# Patient Record
Sex: MALE | Race: WHITE | Employment: UNEMPLOYED | URBAN - NONMETROPOLITAN AREA
[De-identification: names, ages, dates, MRNs, and addresses within clinical notes are randomized per-mention and may not be internally consistent; named-entity substitution may affect disease eponyms.]

---
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
## 2020-07-06 ENCOUNTER — OFFICE VISIT (OUTPATIENT)
Dept: NEUROLOGY | Age: 20
End: 2020-07-06
Payer: COMMERCIAL

## 2020-07-06 VITALS
HEIGHT: 70 IN | WEIGHT: 185 LBS | BODY MASS INDEX: 26.48 KG/M2 | DIASTOLIC BLOOD PRESSURE: 68 MMHG | HEART RATE: 78 BPM | SYSTOLIC BLOOD PRESSURE: 112 MMHG | OXYGEN SATURATION: 98 % | TEMPERATURE: 97.8 F

## 2020-07-06 PROBLEM — R42 DIZZINESS: Status: ACTIVE | Noted: 2020-07-06

## 2020-07-06 PROBLEM — G43.009 MIGRAINE WITHOUT AURA AND WITHOUT STATUS MIGRAINOSUS, NOT INTRACTABLE: Status: ACTIVE | Noted: 2020-07-06

## 2020-07-06 PROCEDURE — G8419 CALC BMI OUT NRM PARAM NOF/U: HCPCS | Performed by: PSYCHIATRY & NEUROLOGY

## 2020-07-06 PROCEDURE — G8427 DOCREV CUR MEDS BY ELIG CLIN: HCPCS | Performed by: PSYCHIATRY & NEUROLOGY

## 2020-07-06 PROCEDURE — 99245 OFF/OP CONSLTJ NEW/EST HI 55: CPT | Performed by: PSYCHIATRY & NEUROLOGY

## 2020-07-06 PROCEDURE — 99214 OFFICE O/P EST MOD 30 MIN: CPT

## 2020-07-06 ASSESSMENT — ENCOUNTER SYMPTOMS
WHEEZING: 0
EYE ITCHING: 0
BLOOD IN STOOL: 0
CHOKING: 0
NAUSEA: 1
COUGH: 0
SORE THROAT: 0
EYE WATERING: 0
CHEST TIGHTNESS: 0
BACK PAIN: 0
SINUS PRESSURE: 0
SCALP TENDERNESS: 0
TROUBLE SWALLOWING: 0
SHORTNESS OF BREATH: 0
ABDOMINAL DISTENTION: 0
PHOTOPHOBIA: 1
ABDOMINAL PAIN: 0
FACIAL SWELLING: 0
EYE REDNESS: 0
DIARRHEA: 0
EYE DISCHARGE: 0
APNEA: 0
RHINORRHEA: 0
FACIAL SWEATING: 0
COLOR CHANGE: 0
VISUAL CHANGE: 1
VOMITING: 0
SWOLLEN GLANDS: 0
EYE PAIN: 0
CONSTIPATION: 0
VOICE CHANGE: 0
BLURRED VISION: 0

## 2020-07-06 NOTE — PROGRESS NOTES
[facility]  Neurology  [address]QO:[phone]   Fax: [phone]    SUBJECTIVE:     PATIENT ID:  Ariana Miguel is a  RIGHT    HANDED 21 y.o. male. Migraine    This is a chronic problem. Episode onset: SINCE    AGE OF   6  YEARS. The problem occurs intermittently. The problem has been waxing and waning. The pain is located in the left unilateral region. The pain does not radiate. The pain quality is not similar to prior headaches. The quality of the pain is described as aching, stabbing, pulsating and throbbing. The pain is at a severity of 3/10. The pain is mild. Associated symptoms include dizziness, nausea, neck pain, phonophobia, photophobia and a visual change. Pertinent negatives include no abdominal pain, abnormal behavior, anorexia, back pain, blurred vision, coughing, drainage, ear pain, eye pain, eye redness, eye watering, facial sweating, fever, hearing loss, insomnia, loss of balance, muscle aches, numbness, rhinorrhea, scalp tenderness, seizures, sinus pressure, sore throat, swollen glands, tingling, tinnitus, vomiting, weakness or weight loss. The symptoms are aggravated by bright light, emotional stress and noise. Treatments tried: TOPAMAX. The treatment provided no relief. History obtained from  The patient     and other  available   medical  records   were  Also  reviewed. The  Duration,  Quality,  Severity,  Location,  Timing,  Context,  Modifying  Factors   Of   The   Chief   Complaint   And  Present  Illness       Was   Reviewed   In   Chronological   Manner.                                                 PATIENT'S  MAIN  CONCERNS INCLUDE :                       1)     H/O    CHRONIC  HEADACHES   SINCE  AGE OF   6   YEARS                          2)      H/O    CHRONIC   MIGRAINES  SINCE  AGE OF   6   YEARS                                     ASSOCIATED    WITH     DIZZINESS,    NAUSEA    AND  VOMITING 1- 2    TIMES     PER  WEEK     LASTING  FOR     4 - 5  HOURS                             2)       HAD   NEUROLOGY    EVALUATION      AT  [facility]     IN   2018                                    HAD   TRIED    TOPAMAX  IN    THE PAST     AND                                      IT  DID  NOT  HELP                                                      3)      CHRONIC      ANXIETY  ,   DEPRESSION                                          AND   ADHD    SINCE     AGE    5 - 6  YEARS                                PATIENT  TO  FOLLOW  WITH  MENTAL  HEALTH PROFESSION                           4)       FAMILY     H/O     MIGRAINES                           5)     PATIENT  DENIES  ANY    BIRTH  AND  DEVELOPMENTAL PROBLEMS                                NO     H/O   HEAD  INJURY  IN   THE PAST                                           6)     IN  VIEW  OF  THE  PATIENT'S    MULTIPLE   NEUROLOGICAL                           SYMPTOMS  AND  CONCERNS    FOR  PROLONGED   DURATION,                           AND    MULTIPLE   CO MORBID  MEDICAL  CONDITIONS,                           PATIENT    NEEDS  NEURO  DIAGNOSTIC  EVALUATIONS                      FOR   ANY   NEUROLOGICAL  PATHOLOGIES    AND  OTHER                        CORRECTABLE   ETIOLOGIES;     AND                              PATIENT  REQUESTS   THE  SAME.                                                   PRECIPITATING  FACTORS: including  fever/infection, exertion/relaxation, position change, stress, weather change,                        medications/alcohol, time of day/darkness/light  Are  present                                                              MODIFYING  FACTORS:  fever/infection, exertion/relaxation, position change, stress, weather change,                        medications/alcohol, time of day/darkness/light  Are  present             Patient   Indicates   The  Presence   And  The  Absence  Of  The  Following    Associated And   Additional  Neurological    Symptoms:                                Balance  And coordination   problems  absent           Gait problems     absent            Headaches      present              Migraines           present           Memory problemsabsent              Confusion        absent            Paresthesia   numbness          absent           Seizures  And  Starring  Episodes           absent           Syncope,  Near  syncopal episodes         absent           Speech   problems           absent             Swallowing   Problems      absent            Dizziness,  Light headedness           present              Vertigo        absent             Generalized   Weakness    absent              focal  Weakness     absent             Tremors         absent              Sleep  Problems     absent             History  Of   Recent  Head  Injury     absent             History  Of   Recent  TIA     absent             History  Of   Recent    Stroke     absent             Neck  Pain   and   Neck muscle  Spasms  absent               Radiating  down   And   Weakness           absent            Lower back   Pain  And     Spasms  absent              Radiating    Down   And   Weakness          absent                H/OFALLS        absent               History  Of   Visual  Symptoms    absent                  Associated   Diplopia       absent                                               Also   Additional   Symptoms   Present    As  Documented    In   The   detailed                  Review  Of  Systems   And    Please   Refer   To    Them for   Additional    Information. Any components  That are either  Unobtainable  Or  Limited  In   HPI, ROS  And/or PFSH   Are                   Due   ToPatient's  Medical  Problems,  Clinical  Condition   and/or lack of                                other    Alternate   resources.           RECORDS   REVIEWED:    historical medical records       INFORMATION REVIEWED:     MEDICAL   HISTORY,SURGICAL   HISTORY,     MEDICATIONS   LIST,   ALLERGIES AND  DRUG  INTOLERANCES,       FAMILY   HISTORY,  SOCIAL  HISTORY,      PROBLEM  LIST   FOR  PATIENT  CARE   COORDINATION      Past Medical History:   Diagnosis Date    ADHD (attention deficit hyperactivity disorder)     Anxiety     Depression     Epigastric pain     Headache     Weight gain          Past Surgical History:   Procedure Laterality Date    COLONOSCOPY  03/27/2019    [facility]    HARDWARE REMOVAL  01/17/2019    LEG SURGERY Right 10/2017    UPPER GASTROINTESTINAL ENDOSCOPY      normal-[facility]-pch         Current Outpatient Medications   Medication Sig Dispense Refill    albuterol sulfate HFA (PROVENTIL HFA) 108 (90 Base) MCG/ACT inhaler Inhale 2 puffs into the lungs      omeprazole (PRILOSEC) 20 MG delayed release capsule Take 20 mg by mouth daily      VENTOLIN  (90 Base) MCG/ACT inhaler inhale 2 puffs by mouth every 4 hours if needed  0    guanFACINE (TENEX) 1 MG tablet TAKE 1 TABLET BY MOUTH AT BEDTIME  0     No current facility-administered medications for this visit.           Allergies   Allergen Reactions    Dilaudid [Hydromorphone Hcl]     Prozac [Fluoxetine Hcl]     Zoloft [Sertraline Hcl]     Amoxicillin Rash         Family History   Problem Relation Age of Onset    High Blood Pressure Mother     Asthma Mother     COPD Mother     Arthritis Mother         Rhuematoid    Heart Disease Mother     Bipolar Disorder Mother     Heart Attack Maternal Grandmother     Early Death Maternal Grandmother 61    Heart Attack Maternal Grandfather     Early Death Maternal Grandfather 61    Heart Disease Paternal Grandmother     Diabetes Paternal Grandmother     Colon Cancer Maternal Aunt 54    Crohn's Disease Neg Hx     Ulcerative Colitis Neg Hx     Colon Polyps Neg Hx          Social History     Socioeconomic History    Marital status: Single     Spouse name: Not on file    Number of children: Not on file    Years of education: Not on file    Highest education level: Not on file   Occupational History    Not on file   Social Needs    Financial resource strain: Not on file    Food insecurity     Worry: Not on file     Inability: Not on file    Transportation needs     Medical: Not on file     Non-medical: Not on file   Tobacco Use    Smoking status: Passive Smoke Exposure - Never Smoker    Smokeless tobacco: Never Used    Tobacco comment: Former user- for about 6 months   Substance and Sexual Activity    Alcohol use: No    Drug use: No    Sexual activity: Not on file   Lifestyle    Physical activity     Days per week: Not on file     Minutes per session: Not on file    Stress: Not on file   Relationships    Social connections     Talks on phone: Not on file     Gets together: Not on file     Attends Church service: Not on file     Active member of club or organization: Not on file     Attends meetings of clubs or organizations: Not on file     Relationship status: Not on file    Intimate partner violence     Fear of current or ex partner: Not on file     Emotionally abused: Not on file     Physically abused: Not on file     Forced sexual activity: Not on file   Other Topics Concern    Not on file   Social History Narrative    Not on file       Vitals:    07/06/20 0943   BP: 112/68   Pulse: 78   Temp: 97.8 °F (36.6 °C)   SpO2: 98%         Wt Readings from Last 3 Encounters:   07/06/20 185 lb (83.9 kg)   03/13/19 175 lb (79.4 kg) (79 %, Z= 0.81)*   10/10/18 182 lb (82.6 kg) (86 %, Z= 1.07)*     * Growth percentiles are based on [facility] (Boys, 2-20 Years) data. BP Readings from Last 3 Encounters:   07/06/20 112/68   03/13/19 (!) 140/94   10/10/18 122/80             Review of Systems   Constitutional: Negative for appetite change, chills, fatigue, fever, unexpected weight change and weight loss.    HENT: Negative for congestion, dental problem, drooling, ear discharge, ear pain, facial swelling, hearing loss, mouth sores, nosebleeds, postnasal drip, rhinorrhea, sinus pressure, sore throat, tinnitus, trouble swallowing and voice change. Eyes: Positive for photophobia. Negative for blurred vision, pain, discharge, redness, itching and visual disturbance. Respiratory: Negative for apnea, cough, choking, chest tightness, shortness of breath and wheezing. Cardiovascular: Negative for chest pain, palpitations and leg swelling. Gastrointestinal: Positive for nausea. Negative for abdominal distention, abdominal pain, anorexia, blood in stool, constipation, diarrhea and vomiting. Endocrine: Negative for cold intolerance, heat intolerance, polydipsia, polyphagia and polyuria. Musculoskeletal: Positive for neck pain. Negative for arthralgias, back pain, gait problem, joint swelling, myalgias and neck stiffness. Skin: Negative for color change, pallor, rash and wound. Allergic/Immunologic: Negative for environmental allergies, food allergies and immunocompromised state. Neurological: Positive for dizziness and headaches. Negative for tingling, tremors, seizures, syncope, facial asymmetry, speech difficulty, weakness, light-headedness, numbness and loss of balance. Hematological: Negative for adenopathy. Does not bruise/bleed easily. Psychiatric/Behavioral: Positive for decreased concentration. Negative for agitation, behavioral problems, confusion, dysphoric mood, hallucinations, self-injury, sleep disturbance and suicidal ideas. The patient is nervous/anxious. The patient does not have insomnia and is not hyperactive. OBJECTIVE:    Physical Exam  Constitutional:       Appearance: He is well-developed. HENT:      Head: Normocephalic and atraumatic. No raccoon eyes or Gordon's sign.       Right Ear: External ear normal.      Left Ear: External ear normal.      Nose: Nose normal.   Eyes:      Conjunctiva/sclera: Conjunctivae normal.      Pupils: negative      ASSESSMENT:        Patient Active Problem List   Diagnosis    Electronic cigarette use    ADHD (attention deficit hyperactivity disorder)    Anxiety    Depression    Headache    Migraine without aura and without status migrainosus, not intractable    Dizziness         VISITING DIAGNOSIS:            ICD-10-CM    1. Migraine without aura and without status migrainosus, not intractable G43.009 Sedimentation Rate     CBC     CHRISSY Screen with Reflex     Rheumatoid Factor     Comprehensive Metabolic Panel     TSH without Reflex     Vitamin B12 & Folate     T. pallidum Ab     Lupus Anticoagulant     Urine Drug Screen     EEG     MRI Brain W WO Contrast   2. Attention deficit hyperactivity disorder (ADHD), unspecified ADHD type F90.9 Sedimentation Rate     CBC     CHRISSY Screen with Reflex     Rheumatoid Factor     Comprehensive Metabolic Panel     TSH without Reflex     Vitamin B12 & Folate     T. pallidum Ab     Lupus Anticoagulant     Urine Drug Screen     EEG     MRI Brain W WO Contrast   3. Anxiety F41.9 Sedimentation Rate     CBC     CHRISSY Screen with Reflex     Rheumatoid Factor     Comprehensive Metabolic Panel     TSH without Reflex     Vitamin B12 & Folate     T. pallidum Ab     Lupus Anticoagulant     Urine Drug Screen     EEG     MRI Brain W WO Contrast   4. Depression, unspecified depression type F32.9 Sedimentation Rate     CBC     CHRISSY Screen with Reflex     Rheumatoid Factor     Comprehensive Metabolic Panel     TSH without Reflex     Vitamin B12 & Folate     T. pallidum Ab     Lupus Anticoagulant     Urine Drug Screen     EEG     MRI Brain W WO Contrast   5. Chronic nonintractable headache, unspecified headache type R51 Sedimentation Rate     CBC     CHRISSY Screen with Reflex     Rheumatoid Factor     Comprehensive Metabolic Panel     TSH without Reflex     Vitamin B12 & Folate     T. pallidum Ab     Lupus Anticoagulant     Urine Drug Screen     EEG     MRI Brain W WO Contrast   6.  Dizziness R42 Sedimentation Rate     CBC     CHRISSY Screen with Reflex     Rheumatoid Factor     Comprehensive Metabolic Panel     TSH without Reflex     Vitamin B12 & Folate     T. pallidum Ab     Lupus Anticoagulant     Urine Drug Screen     EEG     MRI Brain W WO Contrast                    CONCERNS   &   INCREASED   RISK   FOR          *   POORLY  CONTROLLED   &  COMPLICATED  MIGRAINES      *    CHRONIC  TENSION  HEADACHES                 VARIOUS  RISK   FACTORS   WERE  REVIEWED   AND   DISCUSSED. PLAN:                         Yomaira Salas  Of  The  Diagnoses,  The  Management & Treatment  Options            AND    Care  plan  Were          Reviewed and   Discussed   With  patient. * Goals  And  Expectations  Of  The  Therapy  Discussed   And  Reviewed. *   Benefits   And   Side  Effect  Profile  Of  Medication/s   Were   Discussed                * Need   For  Further   Follow up For  The  Various  Problems Were  discussed. * Results  Of  The  Previous  Diagnostic tests were reviewed and  discussed                 Medical  Decision  Making  Was  Made  Based on the   Complexity  Of  Above  Mentioned  Diagnoses,    Data reviewed             And    Risk  Of  Significant   Co morbidities and   complicating   Factors. Medical  Decision  Was   High  Complexity   Due   To  The  Patient's  Multiple  Symptoms,       Complex  Treatment  Regimen,  Multiple medications           and   Risk  Of   Side  Effects,  Difficulty  In  Medication  Management  And  Diagnostic  Challenges       In  View  Of  The  Associated   Co  Morbid  Conditions   And  Problems. *   BE  CAREFUL  WITH  ACTIVITIES   INCLUDING  DRIVING. *   ADEQUATE   FLUIDINTAKE   AND  ELECTROLYTE  BALANCE             * KEEP  DAIRY  OF   THE  NEUROLOGICAL  SYMPTOMS        RECORDING THE    DURATION  AND  FREQUENCY.             *  AVOID    CONDITIONS  AND  FACTORS   THAT  MAKE                  NEUROLOGICAL SYMPTOMS  WORSE.                  *TO  MAINTAIN  REGULAR  SLEEP  WAKE  CYCLES. *   TO  HAVE  ADEQUATE  REST  AND   SLEEP    HOURS.            *    AVOID  ANY USAGE OF    TOBACCO,              EXCESSIVE  ALCOHOL  AND   ILLEGAL   SUBSTANCES          *  CONTINUE   MEDICATIONS    PRESCRIBED     AS    RECOMMENDED       *   Compliance   With  Medications   And  Instructions          *    MIGRAINE/ HEADACHE    DAIRY   WITH  MONITORING                       OF  DURATION  AND  FREQUENCY. *    Prophylactic  Use   Of     Vitamin   B   Complex,  Folic  Acid,    Vitamin  B12    Multivitamin,       Calcium  With  magnesium  And  Vit D    Supplementations   Over  The  Counter  Discussed                                                       *   IN  VIEW  OF  THE  PATIENT'S    MULTIPLE   NEUROLOGICAL                           SYMPTOMS  AND  CONCERNS    FOR  PROLONGED   DURATION,                           AND    MULTIPLE   CO MORBID  MEDICAL  CONDITIONS,                           PATIENT    NEEDS  NEURO  DIAGNOSTIC  EVALUATIONS                      FOR   ANY   NEUROLOGICAL  PATHOLOGIES    AND  OTHER                        CORRECTABLE   ETIOLOGIES;     AND                              PATIENT  REQUESTS   THE  SAME. Controlled Substances Monitoring: Periodic Controlled Substance Monitoring: Possible medication side effects, risk of tolerance/dependence & alternative treatments discussed. , Assessed functional status.  Mendel Pacer, MD)            Orders Placed This Encounter   Procedures    MRI Brain W WO Contrast    Sedimentation Rate    CBC    CHRISSY Screen with Reflex    Rheumatoid Factor    Comprehensive Metabolic Panel    TSH without Reflex    Vitamin B12 & Folate    T. pallidum Ab    Lupus Anticoagulant    Urine Drug Screen    EEG                         *  PATIENT  TO  RETURN  THE  CLINIC  AFTER   ABOVE,                       FOR   FURTHER    RE EVALUATIONS AND  ADDITIONAL  RECOMMENDATIONS ,                        INCLUDING  MEDICAL  MANAGEMENT,                        AS   CLINICALLY    INDICATED. *PATIENT   TO  FOLLOW  UP  WITH   PRIMARY  CARE         OTHER  CONSULTANTS  AS  BEFORE.           *TO  FOLLOW  WITH   MENTAL  HEALTH  PROFESSIONALS ,  INCLUDING            PSYCHOLOGICAL  COUNSELING   AND  PSYCHIATRIC  EVALUTIONS,                   *  Maintain   Healthy  Life Style    With   Periodic  Monitoring  Of      Any  Medical  Conditions  Including   Elevated  Blood  Pressure,  Lipid  Profile,     Blood  Sugar levels  AndHeart  Disease. *   Period   Screening  For  Cancers  Involving  Breast,  Colon,    lungs  And  Other  Organs  As  Applicable,  In consultation   With  Your  Primary Care Providers. *Second  Neurological  Opinion  And  Evaluations  In  [facility]  Setting  If  Patient  Is  Interested. * Please   Contact   Neurology  Clinic   Early   If   Are  Any  New  Neurological   And  Any neurological  Concerns. *  If  The  Patient remains  Neurologically  Stable   Return   To  [facility] Neurology Department   IN    1-2      MONTHS  TIME   FOR  FURTHER              FOLLOW UP.                       *   The  Neurological   Findings,  Possible  Diagnosis,  Differential diagnoses                    And  Options  For    Further   Investigations                   And  management   Are  Discussed  Comprehensively. Medications   And  Prescription   Risks  And  Side effects  Are   Also  Discussed. *  If   There is  Any  Significant  Worsening   Of  Current  Symptoms  And  Or                  If patient  Develops   Any additional  New  NeurologicalSymptoms                  Or  Significant  Concerns   Should  Call  911 or  Go  To  Emergency  Department  For  Further  Immediate  Evaluation. grains.  Limit theamount of soda and sports drinks you have every day. Drink more water when you are thirsty.  Eat at least 5 servings of fruits and vegetables every day. It may seem like a lot, but it is not hard to reach this goal. Aserving or helping is 1 piece of fruit, 1 cup of vegetables, or 2 cups of leafy, raw vegetables. Have an apple or some carrot sticks as an afternoon snack instead of a candy bar. Try to have fruits and/or vegetables at everymeal.   Make exercise part of your daily routine. You may want to start with simple activities, such as walking, bicycling, or slow swimming. Try carlos active 30 to 60 minutes every day. You do not need to do all 30 to 60 minutes all at once. For example, you can exercise 3 times a day for 10 or 20 minutes. Moderate exercise is safe for most people, but it is always agood idea to talk to your doctor before starting an exercise program.   Keep moving. Brittaniguillaume Reedes the lawn, work in the garden, or Beryllium. Take the stairs instead of the elevator at work.  If you smoke, quit. Peoplewho smoke have an increased risk for heart attack, stroke, cancer, and other lung illnesses. Quitting is hard, but there are ways to boost your chance of quitting tobacco for good.  Use nicotine gum, patches, or lozenges.  Ask your doctor about stop-smoking programs and medicines.  Keep trying.  In addition to reducing your risk of diseases in the future, you will notice some benefits soon after you stop using tobacco. If you have shortness of breath or asthma symptoms, they will likely getbetter within a few weeks after you quit.  Limit how much alcohol you drink. Moderate amounts of alcohol (up to 2 drinks a day for men, 1drink a day for women) are okay. But drinking too much can lead to liver problems, high blood pressure, and other health problems.  health   If you have a family, there are many things you can do together to improve your health.    Eat meals together as a family as often as possible.  Eat healthy foods. This includes fruits, vegetables, lean meats and dairy, and whole grains.  Include your family in your fitness plan. Most peoplethink of activities such as jogging or tennis as the way to fitness, but there are many ways you and your family can be more active. Anything that makes you breathe hard and gets your heart pumping is exercise. Here are sometips:   Walk to do errands or to take your child to school or the bus.  Go for a family bike ride after dinner instead of watching TV.  Where can you learn more?  Go toAnnapurna Microfinacetps://[employer]utta[employer]eb."[employer]. org and sign in to your [employer] account. Enter M864 in the Search HealthInformation box to learn more about \"A Healthy Lifestyle: Care Instructions. \"     If you do not have anaccount, please click on the \"Sign Up Now\" link.  Current as of: July 26, 2016   Content Version: 11.2   © 6703-7411 [employer]. Care instructions adapted under license by [facility] ([facility]). If you have questions about a medical condition or this instruction, always ask your healthcare professional. [employer] disclaims any warranty or liability for your use of this information.

## 2020-09-11 ENCOUNTER — HOSPITAL ENCOUNTER (OUTPATIENT)
Dept: MRI IMAGING | Age: 20
Discharge: HOME | End: 2020-09-13
Payer: COMMERCIAL

## 2020-09-11 ENCOUNTER — HOSPITAL ENCOUNTER (OUTPATIENT)
Dept: NEUROLOGY | Age: 20
Discharge: HOME | End: 2020-09-11
Payer: COMMERCIAL

## 2020-09-11 PROCEDURE — 70553 MRI BRAIN STEM W/O & W/DYE: CPT

## 2020-09-11 PROCEDURE — 95813 EEG EXTND MNTR 61-119 MIN: CPT

## 2020-09-11 PROCEDURE — 95957 EEG DIGITAL ANALYSIS: CPT

## 2020-09-11 PROCEDURE — 6360000004 HC RX CONTRAST MEDICATION: Performed by: PSYCHIATRY & NEUROLOGY

## 2020-09-11 PROCEDURE — A9579 GAD-BASE MR CONTRAST NOS,1ML: HCPCS | Performed by: PSYCHIATRY & NEUROLOGY

## 2020-09-11 RX ADMIN — GADOTERIDOL 15 ML: 279.3 INJECTION, SOLUTION INTRAVENOUS at 13:48

## 2020-09-11 NOTE — PROGRESS NOTES
EXTENDED EEG Completed with [facility]. PCP: Kassidy Whitfield    Ordering: Bette De La Fuente, Neurologist    Interpreting Physician: Margarita Francis, Neurologist    Technician: Ally Rios, RRT

## 2020-09-12 NOTE — PROCEDURES
[facility] [address]                 [address] [place] [id number]                         ELECTROENCEPHALOGRAM (EEG) REPORT      PATIENT NAME: Carito Fonseca                    :        2000  MED REC NO:   [id number]                             ROOM:  ACCOUNT NO:   [de-identified]                           ADMIT DATE: 2020  PROVIDER:     Juventino Louise MD    DATE OF STUDY:  2020    TECHNIQUE:  23 channels of EEG, 2 channels of EOG, 2 channel of EKG and  1 channel ground and 1 channel reference were recorded with [employer]  Software 32 Channel System utilizing the International 10/20 System  Protocol. Erick detection and seizure analysis protocols were utilized and the  study was reviewed using the comprehensive EEG monitoring. Erick detection trending allows to see at a glance timing of detected  seizure in the context of other changes in the EEG. Erick detection  analysis automatically notes the most types of electrode artifacts  making trends easier to review and more representative of cerebral  activity. Erick detection analysis also provides collection of trends  for monitoring and review including seizure probability, rhythmic  spectrogram left and right hemispheres, peak envelope, amplitude,  relative symmetry and suppression ratio. This is an extended EEG recorded for more than one hour. CLINICAL DATA:      The patient is 21years old with a history of anxiety,  depression, ADHD, headaches, migraines, dizziness. The purpose of the study is to evaluate for associated seizure activity. BACKGROUND ACTIVITY:      While the patient was awake, the background  activity consisted of fairly-regulated 9 Hz waveforms seen over both  cerebral hemispheres reacting to the eye opening. Intermittent brief eye movement artifacts noted. ACTIVATION PROCEDURES:    HYPERVENTILATION:  Hyperventilation was performed for 3 minutes. Patient was cooperative with good effort. Also Post-Hyperventilation  period was monitored closely. Hyperventilation:  Diffuse slowing noted. PHOTIC STIMULATION:  Photic stimulation was performed at the following  frequencies: 1 Hz, 3 Hz, 6 Hz, 9 Hz, 12 Hz, 15 Hz, 18 Hz, 21 Hz, 25 Hz,  30 Hz and patient tolerated well. Photic stimulation:  Mild bilateral symmetric driving response noted. SLEEP:  Stage I and stage II sleep were noted. EKG:  EKG showed normal sinus rhythm without any significant  abnormality. IMPRESSION:        No significant focal, lateralized or epileptiform features    noted during the current recording. Further clinical correlation and followup recommended. Lexy Clemons MD  Board Certified Neurologist    D: 09/11/2020 17:19:57       T: 09/11/2020 18:04:05     [email or web address]  Job#: [id number]     Doc#: [id number]    CC:     Shun Guardado

## 2020-10-22 ENCOUNTER — HOSPITAL ENCOUNTER (OUTPATIENT)
Dept: LAB | Age: 20
Discharge: HOME | End: 2020-10-22
Payer: COMMERCIAL

## 2020-10-22 ENCOUNTER — OFFICE VISIT (OUTPATIENT)
Dept: NEUROLOGY | Age: 20
End: 2020-10-22
Payer: COMMERCIAL

## 2020-10-22 VITALS
WEIGHT: 176.2 LBS | OXYGEN SATURATION: 98 % | SYSTOLIC BLOOD PRESSURE: 124 MMHG | TEMPERATURE: 97.2 F | DIASTOLIC BLOOD PRESSURE: 72 MMHG | HEIGHT: 70 IN | BODY MASS INDEX: 25.22 KG/M2 | HEART RATE: 98 BPM

## 2020-10-22 PROBLEM — G47.9 SLEEP DIFFICULTIES: Status: ACTIVE | Noted: 2020-10-22

## 2020-10-22 PROBLEM — G44.221 CHRONIC TENSION-TYPE HEADACHE, INTRACTABLE: Status: ACTIVE | Noted: 2020-10-22

## 2020-10-22 PROBLEM — R41.3 MEMORY PROBLEM: Status: ACTIVE | Noted: 2020-10-22

## 2020-10-22 LAB
ALBUMIN SERPL-MCNC: 5.5 G/DL (ref 3.5–5.2)
ALBUMIN/GLOBULIN RATIO: 2.3 (ref 1–2.5)
ALP BLD-CCNC: 51 U/L (ref 40–129)
ALT SERPL-CCNC: 13 U/L (ref 5–41)
AMPHETAMINE SCREEN URINE: NEGATIVE
ANION GAP SERPL CALCULATED.3IONS-SCNC: 11 MMOL/L (ref 9–17)
AST SERPL-CCNC: 16 U/L
BARBITURATE SCREEN URINE: NEGATIVE
BENZODIAZEPINE SCREEN, URINE: NEGATIVE
BILIRUB SERPL-MCNC: 1 MG/DL (ref 0.3–1.2)
BUN BLDV-MCNC: 11 MG/DL (ref 6–20)
BUN/CREAT BLD: 13 (ref 9–20)
BUPRENORPHINE URINE: NEGATIVE
CALCIUM SERPL-MCNC: 10.3 MG/DL (ref 8.6–10.4)
CANNABINOID SCREEN URINE: POSITIVE
CHLORIDE BLD-SCNC: 102 MMOL/L (ref 98–107)
CO2: 27 MMOL/L (ref 20–31)
COCAINE METABOLITE, URINE: NEGATIVE
CREAT SERPL-MCNC: 0.85 MG/DL (ref 0.7–1.2)
FOLATE: 11.8 NG/ML
GFR AFRICAN AMERICAN: >60 ML/MIN
GFR NON-AFRICAN AMERICAN: >60 ML/MIN
GFR SERPL CREATININE-BSD FRML MDRD: ABNORMAL ML/MIN/{1.73_M2}
GFR SERPL CREATININE-BSD FRML MDRD: ABNORMAL ML/MIN/{1.73_M2}
GLUCOSE BLD-MCNC: 92 MG/DL (ref 70–99)
HCT VFR BLD CALC: 42.4 % (ref 40.7–50.3)
HEMOGLOBIN: 14.5 G/DL (ref 13–17)
MCH RBC QN AUTO: 28.8 PG (ref 25.2–33.5)
MCHC RBC AUTO-ENTMCNC: 34.2 G/DL (ref 25.2–33.5)
MCV RBC AUTO: 84.3 FL (ref 82.6–102.9)
MDMA URINE: ABNORMAL
METHADONE SCREEN, URINE: NEGATIVE
METHAMPHETAMINE, URINE: NEGATIVE
NRBC AUTOMATED: 0 PER 100 WBC
OPIATES, URINE: NEGATIVE
OXYCODONE SCREEN URINE: NEGATIVE
PDW BLD-RTO: 12.1 % (ref 11.8–14.4)
PHENCYCLIDINE, URINE: NEGATIVE
PLATELET # BLD: 266 K/UL (ref 138–453)
PMV BLD AUTO: 10.2 FL (ref 8.1–13.5)
POTASSIUM SERPL-SCNC: 4 MMOL/L (ref 3.7–5.3)
PROPOXYPHENE, URINE: NEGATIVE
RBC # BLD: 5.03 M/UL (ref 4.21–5.77)
RHEUMATOID FACTOR: <10 IU/ML
SEDIMENTATION RATE, ERYTHROCYTE: 5 MM (ref 0–15)
SODIUM BLD-SCNC: 140 MMOL/L (ref 135–144)
T. PALLIDUM, IGG: NONREACTIVE
TEST INFORMATION: ABNORMAL
TOTAL PROTEIN: 7.9 G/DL (ref 6.4–8.3)
TRICYCLIC ANTIDEPRESSANTS, UR: NEGATIVE
TSH SERPL DL<=0.05 MIU/L-ACNC: 0.71 MIU/L (ref 0.3–5)
VITAMIN B-12: 815 PG/ML (ref 232–1245)
WBC # BLD: 7.3 K/UL (ref 4.5–13.5)

## 2020-10-22 PROCEDURE — 85730 THROMBOPLASTIN TIME PARTIAL: CPT

## 2020-10-22 PROCEDURE — 99212 OFFICE O/P EST SF 10 MIN: CPT

## 2020-10-22 PROCEDURE — 36415 COLL VENOUS BLD VENIPUNCTURE: CPT

## 2020-10-22 PROCEDURE — 86147 CARDIOLIPIN ANTIBODY EA IG: CPT

## 2020-10-22 PROCEDURE — G8484 FLU IMMUNIZE NO ADMIN: HCPCS | Performed by: PSYCHIATRY & NEUROLOGY

## 2020-10-22 PROCEDURE — 84443 ASSAY THYROID STIM HORMONE: CPT

## 2020-10-22 PROCEDURE — 82607 VITAMIN B-12: CPT

## 2020-10-22 PROCEDURE — 85027 COMPLETE CBC AUTOMATED: CPT

## 2020-10-22 PROCEDURE — G8419 CALC BMI OUT NRM PARAM NOF/U: HCPCS | Performed by: PSYCHIATRY & NEUROLOGY

## 2020-10-22 PROCEDURE — 82746 ASSAY OF FOLIC ACID SERUM: CPT

## 2020-10-22 PROCEDURE — 80053 COMPREHEN METABOLIC PANEL: CPT

## 2020-10-22 PROCEDURE — 80306 DRUG TEST PRSMV INSTRMNT: CPT

## 2020-10-22 PROCEDURE — 85613 RUSSELL VIPER VENOM DILUTED: CPT

## 2020-10-22 PROCEDURE — 1036F TOBACCO NON-USER: CPT | Performed by: PSYCHIATRY & NEUROLOGY

## 2020-10-22 PROCEDURE — 86431 RHEUMATOID FACTOR QUANT: CPT

## 2020-10-22 PROCEDURE — 85651 RBC SED RATE NONAUTOMATED: CPT

## 2020-10-22 PROCEDURE — 99215 OFFICE O/P EST HI 40 MIN: CPT | Performed by: PSYCHIATRY & NEUROLOGY

## 2020-10-22 PROCEDURE — 85610 PROTHROMBIN TIME: CPT

## 2020-10-22 PROCEDURE — 86038 ANTINUCLEAR ANTIBODIES: CPT

## 2020-10-22 PROCEDURE — G8427 DOCREV CUR MEDS BY ELIG CLIN: HCPCS | Performed by: PSYCHIATRY & NEUROLOGY

## 2020-10-22 PROCEDURE — 86780 TREPONEMA PALLIDUM: CPT

## 2020-10-22 RX ORDER — NAPROXEN 500 MG
500 TABLET, DELAYED RELEASE (ENTERIC COATED) ORAL 2 TIMES DAILY WITH MEALS
Qty: 60 TABLET | Refills: 1 | Status: SHIPPED | OUTPATIENT
Start: 2020-10-22 | End: 2021-02-04 | Stop reason: SDUPTHER

## 2020-10-22 RX ORDER — SUMATRIPTAN 100 MG/1
100 TABLET, FILM COATED ORAL
Qty: 12 TABLET | Refills: 2 | Status: SHIPPED | OUTPATIENT
Start: 2020-10-22 | End: 2021-02-04 | Stop reason: SDUPTHER

## 2020-10-22 RX ORDER — ONDANSETRON 4 MG/1
4 TABLET, ORALLY DISINTEGRATING ORAL EVERY 8 HOURS PRN
Qty: 30 TABLET | Refills: 1 | Status: SHIPPED | OUTPATIENT
Start: 2020-10-22 | End: 2021-02-04 | Stop reason: SDUPTHER

## 2020-10-22 RX ORDER — TRAZODONE HYDROCHLORIDE 100 MG/1
TABLET ORAL
Qty: 30 TABLET | Refills: 1 | Status: SHIPPED | OUTPATIENT
Start: 2020-10-22 | End: 2021-02-04 | Stop reason: SDUPTHER

## 2020-10-22 ASSESSMENT — ENCOUNTER SYMPTOMS
ABDOMINAL DISTENTION: 0
SORE THROAT: 0
ABDOMINAL PAIN: 0
FACIAL SWELLING: 0
EYE ITCHING: 0
CHEST TIGHTNESS: 0
COUGH: 0
COLOR CHANGE: 0
EYE DISCHARGE: 0
CHOKING: 0
CONSTIPATION: 0
DIARRHEA: 0
TROUBLE SWALLOWING: 0
EYE WATERING: 0
EYE PAIN: 0
BACK PAIN: 0
SHORTNESS OF BREATH: 0
RHINORRHEA: 0
EYE REDNESS: 0
NAUSEA: 1
VOMITING: 0
BLURRED VISION: 0
SCALP TENDERNESS: 0
WHEEZING: 0
BLOOD IN STOOL: 0
APNEA: 0
SINUS PRESSURE: 0
FACIAL SWEATING: 0
VISUAL CHANGE: 1
PHOTOPHOBIA: 1
SWOLLEN GLANDS: 0
VOICE CHANGE: 0

## 2020-10-22 NOTE — PATIENT INSTRUCTIONS
[facility] NEUROLOGY    Due to the complex nature of our neurological testing, It is the policy of the Neurology Department not to release the results of your testing over the phone. Once all testing is completed and we have all the results back, Dr. Magali Sahu will then personally go over all the results with you and answer any questions that you may have during a follow up appointment. If you are unable to keep this appointment, please notify the [facility] @ [phone], as soon as possible. Please bring your prescription bottles to all appointments. *   ADEQUATE   FLUID  INTAKE   AND  ELECTROLYTE  BALANCE           * KEEP  DAIRY  OF   THE  NEUROLOGICAL  SYMPTOMS          *  TO  MAINTAIN  REGULAR  SLEEP  WAKE  CYCLES. *   TO  HAVE  ADEQUATE  REST  AND   SLEEP    HOURS.        *    AVOID  USAGE OF   TOBACCO,  EXCESSIVE  ALCOHOL                AND   ILLEGAL   SUBSTANCES,  IF  ANY          *  Maintain   Healthy  Life Style    With   Periodic  Monitoring  Of      Any  Medical  Conditions  Including   Elevated  Blood  Pressure,  Lipid  Profile,     Blood  Sugar levels  And   Heart  Disease. *   Period   Screening  For  Cancers  Involving  Breast,  Colon,   lungs  And  Other  Organs  As  Applicable,  In consultation   With  Your  Primary Care Providers. *  If   There is  Any  Significant  Worsening   Of  Current  Symptoms  And  Or  If    Any additional  New  Neurological  Symptoms                 Or  Significant  Concerns   Should  Call  911 or  Go  To  Emergency  Department  For  Further  Immediate  Evaluation.

## 2020-10-22 NOTE — PROGRESS NOTES
[facility]  Neurology  [address] [address]  XUAMB:[phone]   Fax: [phone]    SUBJECTIVE:     PATIENT ID:  Amy Parents is a  RIGHT    HANDED 21 y.o. male. Migraine    This is a chronic problem. Episode onset: SINCE    AGE OF   6  YEARS. The problem occurs intermittently. The problem has been waxing and waning. The pain is located in the left unilateral region. The pain does not radiate. The pain quality is not similar to prior headaches. The quality of the pain is described as aching, stabbing, pulsating and throbbing. The pain is at a severity of 3/10. The pain is mild. Associated symptoms include dizziness, nausea, neck pain, phonophobia, photophobia and a visual change. Pertinent negatives include no abdominal pain, abnormal behavior, anorexia, back pain, blurred vision, coughing, drainage, ear pain, eye pain, eye redness, eye watering, facial sweating, fever, hearing loss, insomnia, loss of balance, muscle aches, numbness, rhinorrhea, scalp tenderness, seizures, sinus pressure, sore throat, swollen glands, tingling, tinnitus, vomiting, weakness or weight loss. The symptoms are aggravated by bright light, emotional stress and noise. Treatments tried: TOPAMAX. The treatment provided no relief. History obtained from  The patient     and other  available   medical  records   were  Also  reviewed. The  Duration,  Quality,  Severity,  Location,  Timing,  Context,  Modifying  Factors   Of   The   Chief   Complaint       And  Present  Illness  Was   Reviewed   In   Chronological   Manner.                                                 PATIENT'S  MAIN  CONCERNS INCLUDE :                       1)     H/O    CHRONIC  HEADACHES   SINCE  AGE OF   6   YEARS                                           TENSION     HEADACHES                             2)      H/O    CHRONIC   MIGRAINES  SINCE  AGE OF   6   YEARS ASSOCIATED    WITH     DIZZINESS,    NAUSEA    AND  VOMITING                                              1- 2    TIMES     PER  WEEK     LASTING  FOR     4 - 5  HOURS                             2)       HAD   NEUROLOGY    EVALUATION      AT  [facility]     IN   2018                                    HAD   TRIED    TOPAMAX  IN    THE PAST     AND                                      IT  DID  NOT  HELP                                                      3)      CHRONIC      ANXIETY  ,   DEPRESSION                                          AND   ADHD    SINCE     AGE    5 - 6  YEARS                                PATIENT  TO  FOLLOW  WITH  MENTAL  HEALTH PROFESSION                           4)       FAMILY     H/O     MIGRAINES                           5)     PATIENT  DENIES  ANY    BIRTH  AND  DEVELOPMENTAL PROBLEMS                                NO     H/O   HEAD  INJURY  IN   THE PAST                                           6)    H/O     CHRONIC  SLEEP   DIFFICULTIES                            7)     CHRONIC  MILD  MEMORY PROBLEMS                             7)       PATIENT    HAD   NEURO  DIAGNOSTIC  EVALUATIONS  IN   SEPT. 2020                           A)       MRI  BRAIN  AND  EEG    DID  NOT  SHOW                                  ANY  SIGNIFICANT  ABNORMALITIES                            B)    LABS    -   PATIENT  NOT  COMPLETED     THE  SAME. 8)     AS   DISCUSSED    WITH  PATIENT                               PATIENT  WILL   BE  TRIED  WITH     MEDICATIONS  FOR                                 HIS  CHRONIC  HEADACHES  AND    MIGRAINES ,   SLEEP  DIFFICULTIES                                                                                 EXPECTATIONS,   GOALS   AND  SIDE  EFFECTS  MEDICATIONS    WERE                                 REVIEWED     AND   DISCUSSED    IN    DETAIL.                                                       PRECIPITATING  FACTORS: including fever/infection, exertion/relaxation, position change, stress, weather change,                        medications/alcohol, time of day/darkness/light  Are  present                                                              MODIFYING  FACTORS:  fever/infection, exertion/relaxation, position change, stress, weather change,                        medications/alcohol, time of day/darkness/light  Are  present             Patient   Indicates   The  Presence   And  The  Absence  Of  The  Following    Associated  And   Additional  Neurological    Symptoms:                                Balance  And coordination   problems  absent           Gait problems     absent            Headaches      present              Migraines           present           Memory problemsabsent              Confusion        absent            Paresthesia   numbness          absent           Seizures  And  Starring  Episodes           absent           Syncope,  Near  syncopal episodes         absent           Speech   problems           absent             Swallowing   Problems      absent            Dizziness,  Light headedness           present              Vertigo        absent             Generalized   Weakness    absent              focal  Weakness     absent             Tremors         absent              Sleep  Problems     absent             History  Of   Recent  Head  Injury     absent             History  Of   Recent  TIA     absent             History  Of   Recent    Stroke     absent             Neck  Pain   and   Neck muscle  Spasms  absent               Radiating  down   And   Weakness           absent            Lower back   Pain  And     Spasms  absent              Radiating    Down   And   Weakness          absent                H/OFALLS        absent               History  Of   Visual  Symptoms    absent                  Associated   Diplopia       absent                                               Also   Additional   Symptoms Present    As  Documented    In   The   detailed                  Review  Of  Systems   And    Please   Refer   To    Them for   Additional    Information. Any components  That are either  Unobtainable  Or  Limited  In   HPI, ROS  And/or PFSH   Are                   Due   ToPatient's  Medical  Problems,  Clinical  Condition   and/or lack of                                other    Alternate   resources.           RECORDS   REVIEWED:    historical medical records       INFORMATION   REVIEWED:     MEDICAL   HISTORY,SURGICAL   HISTORY,     MEDICATIONS   LIST,   ALLERGIES AND  DRUG  INTOLERANCES,       FAMILY   HISTORY,  SOCIAL  HISTORY,      PROBLEM  LIST   FOR  PATIENT  CARE   COORDINATION      Past Medical History:   Diagnosis Date    ADHD (attention deficit hyperactivity disorder)     Anxiety     Depression     Epigastric pain     Headache     Weight gain          Past Surgical History:   Procedure Laterality Date    COLONOSCOPY  03/27/2019    [facility]    HARDWARE REMOVAL  01/17/2019    LEG SURGERY Right 10/2017    UPPER GASTROINTESTINAL ENDOSCOPY      normal-[facility]         Current Outpatient Medications   Medication Sig Dispense Refill    SUMAtriptan (IMITREX) 100 MG tablet Take 1 tablet by mouth once as needed for Migraine 12 tablet 2    naproxen (EC-NAPROSYN) 500 MG EC tablet Take 1 tablet by mouth 2 times daily (with meals) AS  NEEDED 60 tablet 1    ondansetron (ZOFRAN ODT) 4 MG disintegrating tablet Take 1 tablet by mouth every 8 hours as needed for Nausea or Vomiting 30 tablet 1    traZODone (DESYREL) 100 MG tablet 1/2 TO  ONE  TABLET   AT  BEDTIME 30 tablet 1    albuterol sulfate HFA (PROVENTIL HFA) 108 (90 Base) MCG/ACT inhaler Inhale 2 puffs into the lungs      VENTOLIN  (90 Base) MCG/ACT inhaler inhale 2 puffs by mouth every 4 hours if needed  0    guanFACINE (TENEX) 1 MG tablet TAKE 1 TABLET BY MOUTH AT BEDTIME  0    omeprazole (PRILOSEC) 20 MG delayed release capsule Take 20 mg by mouth daily       No current facility-administered medications for this visit.           Allergies   Allergen Reactions    Dilaudid [Hydromorphone Hcl]     Prozac [Fluoxetine Hcl]     Zoloft [Sertraline Hcl]     Amoxicillin Rash         Family History   Problem Relation Age of Onset    High Blood Pressure Mother     Asthma Mother     COPD Mother     Arthritis Mother         Rhuematoid    Heart Disease Mother     Bipolar Disorder Mother     Heart Attack Maternal Grandmother     Early Death Maternal Grandmother 61    Heart Attack Maternal Grandfather     Early Death Maternal Grandfather 61    Heart Disease Paternal Grandmother     Diabetes Paternal Grandmother     Colon Cancer Maternal Aunt 54    Crohn's Disease Neg Hx     Ulcerative Colitis Neg Hx     Colon Polyps Neg Hx          Social History     Socioeconomic History    Marital status: Single     Spouse name: Not on file    Number of children: Not on file    Years of education: Not on file    Highest education level: Not on file   Occupational History    Not on file   Social Needs    Financial resource strain: Not on file    Food insecurity     Worry: Not on file     Inability: Not on file    Transportation needs     Medical: Not on file     Non-medical: Not on file   Tobacco Use    Smoking status: Passive Smoke Exposure - Never Smoker    Smokeless tobacco: Never Used    Tobacco comment: Former user- for about 6 months   Substance and Sexual Activity    Alcohol use: No    Drug use: No    Sexual activity: Not on file   Lifestyle    Physical activity     Days per week: Not on file     Minutes per session: Not on file    Stress: Not on file   Relationships    Social connections     Talks on phone: Not on file     Gets together: Not on file     Attends [employer] service: Not on file     Active member of club or organization: Not on file     Attends meetings of clubs or organizations: Not on file Relationship status: Not on file    Intimate partner violence     Fear of current or ex partner: Not on file     Emotionally abused: Not on file     Physically abused: Not on file     Forced sexual activity: Not on file   Other Topics Concern    Not on file   Social History Narrative    Not on file       Vitals:    10/22/20 1351   BP: 124/72   Pulse: 98   Temp: 97.2 °F (36.2 °C)   SpO2: 98%         Wt Readings from Last 3 Encounters:   10/22/20 176 lb 3.2 oz (79.9 kg)   07/06/20 185 lb (83.9 kg)   03/13/19 175 lb (79.4 kg) (79 %, Z= 0.81)*     * Growth percentiles are based on [facility] (Boys, 2-20 Years) data. BP Readings from Last 3 Encounters:   10/22/20 124/72   07/06/20 112/68   03/13/19 (!) 140/94             Review of Systems   Constitutional: Negative for appetite change, chills, fatigue, fever, unexpected weight change and weight loss. HENT: Negative for congestion, dental problem, drooling, ear discharge, ear pain, facial swelling, hearing loss, mouth sores, nosebleeds, postnasal drip, rhinorrhea, sinus pressure, sore throat, tinnitus, trouble swallowing and voice change. Eyes: Positive for photophobia. Negative for blurred vision, pain, discharge, redness, itching and visual disturbance. Respiratory: Negative for apnea, cough, choking, chest tightness, shortness of breath and wheezing. Cardiovascular: Negative for chest pain, palpitations and leg swelling. Gastrointestinal: Positive for nausea. Negative for abdominal distention, abdominal pain, anorexia, blood in stool, constipation, diarrhea and vomiting. Endocrine: Negative for cold intolerance, heat intolerance, polydipsia, polyphagia and polyuria. Musculoskeletal: Positive for neck pain. Negative for arthralgias, back pain, gait problem, joint swelling, myalgias and neck stiffness. Skin: Negative for color change, pallor, rash and wound.    Allergic/Immunologic: Negative for environmental allergies, food allergies and immunocompromised state. Neurological: Positive for dizziness and headaches. Negative for tingling, tremors, seizures, syncope, facial asymmetry, speech difficulty, weakness, light-headedness, numbness and loss of balance. Hematological: Negative for adenopathy. Does not bruise/bleed easily. Psychiatric/Behavioral: Positive for decreased concentration. Negative for agitation, behavioral problems, confusion, dysphoric mood, hallucinations, self-injury, sleep disturbance and suicidal ideas. The patient is nervous/anxious. The patient does not have insomnia and is not hyperactive. OBJECTIVE:    Physical Exam  Constitutional:       Appearance: He is well-developed. HENT:      Head: Normocephalic and atraumatic. No raccoon eyes or Gordon's sign. Right Ear: External ear normal.      Left Ear: External ear normal.      Nose: Nose normal.   Eyes:      Conjunctiva/sclera: Conjunctivae normal.      Pupils: Pupils are equal, round, and reactive to light. Neck:      Musculoskeletal: Normal range of motion and neck supple. Normal range of motion. No neck rigidity or muscular tenderness. Thyroid: No thyroid mass or thyromegaly. Vascular: No carotid bruit. Trachea: No tracheal deviation. Meningeal: Brudzinski's sign and Kernig's sign absent. Cardiovascular:      Rate and Rhythm: Normal rate and regular rhythm. Pulmonary:      Effort: Pulmonary effort is normal.   Musculoskeletal: Normal range of motion. General: No tenderness. Skin:     General: Skin is warm. Coloration: Skin is not pale. Findings: No erythema or rash. Nails: There is no clubbing. Psychiatric:         Attention and Perception: He is attentive. Mood and Affect: Mood is anxious. Mood is not depressed. Affect is not labile, blunt or inappropriate. Speech: He is communicative.  Speech is not rapid and pressured, delayed, slurred or tangential.         Behavior: Behavior is not agitated, slowed, aggressive, withdrawn, hyperactive or combative. Behavior is cooperative. Thought Content: Thought content is not paranoid or delusional. Thought content does not include homicidal or suicidal ideation. Thought content does not include homicidal or suicidal plan. Cognition and Memory: Memory is not impaired. He does not exhibit impaired recent memory or impaired remote memory. Judgment: Judgment is not impulsive or inappropriate. NEUROLOGICALEXAMINATION :       A) MENTAL STATUS:                   Alert and  oriented  To time, place  And  Person. No Aphasia. No  Dysarthria. Able   To  Follow   THREE    Stepcommands   without   Any  Difficulty. No right  To left confusion. Normal  Speech  And language function. Insight and  Judgment ,Fund  Of  Knowledge   within normal limits                Recent  And  Remote memory  within   normal limits                Attention &  Concentration are within   normal limits                                                 B) CRANIAL NERVES :      CN : Visual  Acuity  And  Visual fields  within normal limits               Fundi  Could  Not  Be  Could  Not  Be  Evaluated. 3,4,6 CN : Both  Pupils are   Reactive and  Equal.  Movements  Are  Intact. No  Nystagmus. No  MANUEL. No  Afferent  Pupillary  Defect noted. 5 CN :  Normal  Facial sensations and Corneal  Reflexes           7 CN:  Normal  Facial  Symmetry  And  Strength. No facial  Weakness.            8 CN :  Hearing  Appears within normal limits          9, 10 CN: Normal   spontaneous, reflex   palate   movements         11 CN:   Normal  Shoulder  shrug and  strength         12 CN :   Normal  Tongue movements and  Tongue  In midline                        No tongue   Fasciculations or atrophy       C) MOTOR  EXAM:                 Strength  In upper And  Lower   extremities   within   normal limits               No  Drift. No  Atrophy               Rapid   alternating  And  repetitions  Movements  within   normal limits               Muscle  Tone  In upper  And  Lower  Extremities  normal                No rigidity. No  Spasticity. Bradykinesia   absent               No  Asterixis. Sustention  Tremor , Resting   Tremor   absent                    No   other  Abnormal  Movements noted           D) SENSORY :               Light   touch, pinprick,   position  And  Vibration  within normal limits        E) REFLEXES:                   Deep  Tendon  Reflexes   normal                  No  pathological  Reflexes  Bilaterally. F) COORDINATION  AND  GAIT :                                Station and  Gait  normal                              Romberg 's test negative                          Ataxia negative      ASSESSMENT:        Patient Active Problem List   Diagnosis    Electronic cigarette use    ADHD (attention deficit hyperactivity disorder)    Anxiety    Depression    Headache    Migraine without aura and without status migrainosus, not intractable    Dizziness    Sleep difficulties    Chronic tension-type headache, intractable    Memory problem       MRI OF THE BRAIN WITHOUT AND WITH CONTRAST  9/11/2020 1:15 pm         TECHNIQUE:    Multiplanar multisequence MRI of the head/brain was performed without and    with the administration of intravenous contrast.         COMPARISON:    None.         HISTORY:    ORDERING SYSTEM PROVIDED HISTORY: Chronic nonintractable headache,    unspecified headache type    TECHNOLOGIST PROVIDED HISTORY:    Reason for Exam:  Chronic migraine headaches, worse recently.     Acuity: Chronic    Type of Exam: Unknown    Additional signs and symptoms: Chronic non intractable headache, unspecified    headache type; Migraine without aura and without status migrainosus, & Treatment  Options            AND    Care  plan  Were          Reviewed and   Discussed   With  patient. * Goals  And  Expectations  Of  The  Therapy  Discussed   And  Reviewed. *   Benefits   And   Side  Effect  Profile  Of  Medication/s   Were   Discussed                * Need   For  Further   Follow up For  The  Various  Problems Were  discussed. * Results  Of  The  Previous  Diagnostic tests were reviewed and  discussed                 Medical  Decision  Making  Was  Made  Based on the   Complexity  Of  Above  Mentioned  Diagnoses,    Data reviewed             And    Risk  Of  Significant   Co morbidities and   complicating   Factors. Medical  Decision  Was   High  Complexity   Due   To  The  Patient's  Multiple  Symptoms,       Complex  Treatment  Regimen,  Multiple medications           and   Risk  Of   Side  Effects,  Difficulty  In  Medication  Management  And  Diagnostic  Challenges       In  View  Of  The  Associated   Co  Morbid  Conditions   And  Problems. *   BE  CAREFUL  WITH  ACTIVITIES   INCLUDING  DRIVING. *   ADEQUATE   FLUIDINTAKE   AND  ELECTROLYTE  BALANCE             * KEEP  DAIRY  OF   THE  NEUROLOGICAL  SYMPTOMS        RECORDING THE    DURATION  AND  FREQUENCY. *  AVOID    CONDITIONS  AND  FACTORS   THAT  MAKE                  NEUROLOGICAL  SYMPTOMS  WORSE.                  *TO  MAINTAIN  REGULAR  SLEEP  WAKE  CYCLES. *   TO  HAVE  ADEQUATE  REST  AND   SLEEP    HOURS.            *    AVOID  ANY USAGE OF    TOBACCO,              EXCESSIVE  ALCOHOL  AND   ILLEGAL   SUBSTANCES          *  CONTINUE   MEDICATIONS    PRESCRIBED     AS    RECOMMENDED       *   Compliance   With  Medications   And  Instructions          *    MIGRAINE/ HEADACHE    DAIRY   WITH  MONITORING                       OF  DURATION  AND  FREQUENCY.             *    Prophylactic  Use   Of     Vitamin   B   Complex,  Folic  Acid,    Vitamin B12    Multivitamin,       Calcium  With  magnesium  And  Vit D    Supplementations   Over  The  Counter  Discussed                              *           PATIENT    HAD   NEURO  DIAGNOSTIC  EVALUATIONS  IN   2020                           A)       MRI  BRAIN  AND  EEG    DID  NOT  SHOW                                  ANY  SIGNIFICANT  ABNORMALITIES                            B)    LABS    -   PATIENT  NOT  COMPLETED     THE  SAME. *    AS   DISCUSSED    WITH  PATIENT                               PATIENT  WILL   BE  TRIED  WITH     MEDICATIONS  FOR                                 HIS  CHRONIC  HEADACHES  AND    MIGRAINES ,   SLEEP  DIFFICULTIES                                                                              *   EXPECTATIONS,   GOALS   AND  SIDE  EFFECTS  MEDICATIONS    WERE                                 REVIEWED     AND   DISCUSSED    IN    DETAIL.                                Orders Placed This Encounter   Medications    SUMAtriptan (IMITREX) 100 MG tablet     Sig: Take 1 tablet by mouth once as needed for Migraine     Dispense:  12 tablet     Refill:  2    naproxen (EC-NAPROSYN) 500 MG EC tablet     Sig: Take 1 tablet by mouth 2 times daily (with meals) AS  NEEDED     Dispense:  60 tablet     Refill:  1    ondansetron (ZOFRAN ODT) 4 MG disintegrating tablet     Sig: Take 1 tablet by mouth every 8 hours as needed for Nausea or Vomiting     Dispense:  30 tablet     Refill:  1    traZODone (DESYREL) 100 MG tablet     Si/2 TO  ONE  TABLET   AT  BEDTIME     Dispense:  30 tablet     Refill:  1                 *PATIENT   TO  FOLLOW  UP  WITH   PRIMARY  CARE         OTHER  CONSULTANTS  AS  BEFORE.           *TO  FOLLOW  WITH   MENTAL  HEALTH  PROFESSIONALS ,  INCLUDING            PSYCHOLOGICAL  COUNSELING   AND  PSYCHIATRIC  EVALUTIONS,                   *  Maintain   Healthy  Life Style    With   Periodic  Monitoring  Of      Any  Medical  Conditions Including   Elevated  Blood  Pressure,  Lipid  Profile,     Blood  Sugar levels  AndHeart  Disease. *   Period   Screening  For  Cancers  Involving  Breast,  Colon,    lungs  And  Other  Organs  As  Applicable,  In consultation   With  Your  Primary Care Providers. *Second  Neurological  Opinion  And  Evaluations  In  [facility]  Setting  If  Patient  Is  Interested. * Please   Contact   Neurology  Clinic   Early   If   Are  Any  New  Neurological   And  Any neurological  Concerns. *  If  The  Patient remains  Neurologically  Stable   Return   To  [facility] Neurology Department   IN    1-2      MONTHS  TIME   FOR  FURTHER              FOLLOW UP.                       *   The  Neurological   Findings,  Possible  Diagnosis,  Differential diagnoses                    And  Options  For    Further   Investigations                   And  management   Are  Discussed  Comprehensively. Medications   And  Prescription   Risks  And  Side effects  Are   Also  Discussed. *  If   There is  Any  Significant  Worsening   Of  Current  Symptoms  And  Or                  If patient  Develops   Any additional  New  NeurologicalSymptoms                  Or  Significant  Concerns   Should  Call  911 or  Go  To  Emergency  Department  For  Further  Immediate  Evaluation. The   Above  Were  Reviewed  With  patient   and                       questions  Answered  In  Detail. More   Than  50% of face  To face Time   Was  Spent  On  Counseling                    And   Coordination  Of  Care   Of   Patient's  multiple   Neurological  Problems                         And   Comorbid  Medical   Conditions.             Electronically signed by Tobias Truong MD    Board Certified in  Neurology &  In  [facility] ([facility])      DISCLAIMER:   Although every effort was made to ensure the accuracy of this  electronictranscription, some errors in transcription may have occurred. GENERAL PATIENT INSTRUCTIONS:      A Healthy Lifestyle: Care Instructions   Your Care Instructions   A healthy lifestyle can help you feel good, stay at ahealthy weight, and have plenty of energy for both work and play. A healthy lifestyle is something you can share with your whole family.  A healthy lifestyle also can lower your risk for serious health problems, such ashigh blood pressure, heart disease, and diabetes.  You can follow a few steps listed below to improve your health and the health of your family.  Follow-up careis a key part of your treatment and safety. Be sure to make and go to all appointments, and call your doctor if you are having problems. Its also a good idea to know your test results and keep a list of the medicines you take.  How can you care for yourself at home?  Do not eat too much sugar, fat, or fast foods. You can still have dessert and treats nowand then. The goal is moderation.  Start small to improve your eating habits. Pay attention to portion sizes, drink less juice and soda pop, and eat more fruits and vegetables.  Eat a healthy amount of food. A 3-ounce serving of meat, for example, is about the size of a deck of cards. Fill the rest of your plate with vegetables and whole grains.  Limit theamount of soda and sports drinks you have every day. Drink more water when you are thirsty.  Eat at least 5 servings of fruits and vegetables every day. It may seem like a lot, but it is not hard to reach this goal. Aserving or helping is 1 piece of fruit, 1 cup of vegetables, or 2 cups of leafy, raw vegetables. Have an apple or some carrot sticks as an afternoon snack instead of a candy bar. Try to have fruits and/or vegetables at everymeal.   Make exercise part of your daily routine.  You may want to start

## 2020-10-23 LAB — ANTI-NUCLEAR ANTIBODY (ANA): NEGATIVE

## 2020-10-27 LAB
ANTICARDIOLIPIN IGA ANTIBODY: 3.3 APU
ANTICARDIOLIPIN IGG ANTIBODY: 1.4 GPU
CARDIOLIPIN AB IGM: 3.1 MPU
DILUTE RUSSELL VIPER VENOM TIME: NORMAL
INR BLD: 1
LUPUS ANTICOAG: NORMAL
PARTIAL THROMBOPLASTIN TIME: 24.5 SEC (ref 20.5–30.5)
PROTHROMBIN TIME: 10.4 SEC (ref 9–12)

## 2021-02-04 ENCOUNTER — OFFICE VISIT (OUTPATIENT)
Dept: NEUROLOGY | Age: 21
End: 2021-02-04
Payer: COMMERCIAL

## 2021-02-04 VITALS
RESPIRATION RATE: 14 BRPM | BODY MASS INDEX: 24.74 KG/M2 | HEART RATE: 72 BPM | DIASTOLIC BLOOD PRESSURE: 68 MMHG | WEIGHT: 172.8 LBS | SYSTOLIC BLOOD PRESSURE: 114 MMHG | HEIGHT: 70 IN | OXYGEN SATURATION: 98 %

## 2021-02-04 DIAGNOSIS — F32.0 CURRENT MILD EPISODE OF MAJOR DEPRESSIVE DISORDER, UNSPECIFIED WHETHER RECURRENT (HCC): ICD-10-CM

## 2021-02-04 DIAGNOSIS — G44.221 CHRONIC TENSION-TYPE HEADACHE, INTRACTABLE: ICD-10-CM

## 2021-02-04 DIAGNOSIS — R42 DIZZINESS: ICD-10-CM

## 2021-02-04 DIAGNOSIS — G47.9 SLEEP DIFFICULTIES: ICD-10-CM

## 2021-02-04 DIAGNOSIS — G44.209 TENSION-TYPE HEADACHE, NOT INTRACTABLE, UNSPECIFIED CHRONICITY PATTERN: ICD-10-CM

## 2021-02-04 DIAGNOSIS — G43.009 MIGRAINE WITHOUT AURA AND WITHOUT STATUS MIGRAINOSUS, NOT INTRACTABLE: Primary | ICD-10-CM

## 2021-02-04 DIAGNOSIS — R41.3 MEMORY PROBLEM: ICD-10-CM

## 2021-02-04 DIAGNOSIS — F41.9 ANXIETY: ICD-10-CM

## 2021-02-04 DIAGNOSIS — Z78.9 ELECTRONIC CIGARETTE USE: ICD-10-CM

## 2021-02-04 DIAGNOSIS — F90.0 ATTENTION DEFICIT HYPERACTIVITY DISORDER (ADHD), PREDOMINANTLY INATTENTIVE TYPE: ICD-10-CM

## 2021-02-04 PROCEDURE — 99213 OFFICE O/P EST LOW 20 MIN: CPT

## 2021-02-04 PROCEDURE — 1036F TOBACCO NON-USER: CPT | Performed by: PSYCHIATRY & NEUROLOGY

## 2021-02-04 PROCEDURE — 99214 OFFICE O/P EST MOD 30 MIN: CPT | Performed by: PSYCHIATRY & NEUROLOGY

## 2021-02-04 PROCEDURE — G8427 DOCREV CUR MEDS BY ELIG CLIN: HCPCS | Performed by: PSYCHIATRY & NEUROLOGY

## 2021-02-04 PROCEDURE — G8484 FLU IMMUNIZE NO ADMIN: HCPCS | Performed by: PSYCHIATRY & NEUROLOGY

## 2021-02-04 PROCEDURE — G8420 CALC BMI NORM PARAMETERS: HCPCS | Performed by: PSYCHIATRY & NEUROLOGY

## 2021-02-04 RX ORDER — TRAZODONE HYDROCHLORIDE 100 MG/1
TABLET ORAL
Qty: 30 TABLET | Refills: 2 | Status: SHIPPED | OUTPATIENT
Start: 2021-02-04 | End: 2021-05-12 | Stop reason: SDUPTHER

## 2021-02-04 RX ORDER — ONDANSETRON 4 MG/1
4 TABLET, ORALLY DISINTEGRATING ORAL EVERY 8 HOURS PRN
Qty: 30 TABLET | Refills: 1 | Status: SHIPPED | OUTPATIENT
Start: 2021-02-04 | End: 2021-05-12 | Stop reason: SDUPTHER

## 2021-02-04 RX ORDER — TOPIRAMATE 25 MG/1
25 CAPSULE, COATED PELLETS ORAL 2 TIMES DAILY
COMMUNITY
End: 2021-02-04 | Stop reason: SDUPTHER

## 2021-02-04 RX ORDER — NAPROXEN 500 MG/1
500 TABLET ORAL 2 TIMES DAILY WITH MEALS
Qty: 60 TABLET | Refills: 1 | Status: SHIPPED | OUTPATIENT
Start: 2021-02-04 | End: 2021-05-12 | Stop reason: SDUPTHER

## 2021-02-04 RX ORDER — SUMATRIPTAN 100 MG/1
100 TABLET, FILM COATED ORAL
Qty: 12 TABLET | Refills: 2 | Status: SHIPPED | OUTPATIENT
Start: 2021-02-04 | End: 2021-05-12 | Stop reason: SDUPTHER

## 2021-02-04 RX ORDER — TOPIRAMATE 25 MG/1
25 CAPSULE, COATED PELLETS ORAL 2 TIMES DAILY
Qty: 60 CAPSULE | Refills: 2 | Status: SHIPPED | OUTPATIENT
Start: 2021-02-04 | End: 2021-05-12 | Stop reason: ALTCHOICE

## 2021-02-04 RX ORDER — QUETIAPINE FUMARATE 50 MG/1
50 TABLET, EXTENDED RELEASE ORAL NIGHTLY
COMMUNITY
End: 2021-08-12

## 2021-02-04 ASSESSMENT — ENCOUNTER SYMPTOMS
VOICE CHANGE: 0
PHOTOPHOBIA: 1
SORE THROAT: 0
EYE DISCHARGE: 0
EYE PAIN: 0
BLURRED VISION: 0
BLOOD IN STOOL: 0
ABDOMINAL PAIN: 0
ABDOMINAL DISTENTION: 0
COUGH: 0
SWOLLEN GLANDS: 0
APNEA: 0
EYE WATERING: 0
CHOKING: 0
COLOR CHANGE: 0
SHORTNESS OF BREATH: 0
EYE ITCHING: 0
FACIAL SWEATING: 0
TROUBLE SWALLOWING: 0
WHEEZING: 0
SCALP TENDERNESS: 0
NAUSEA: 1
FACIAL SWELLING: 0
BACK PAIN: 0
VOMITING: 0
DIARRHEA: 0
RHINORRHEA: 0
SINUS PRESSURE: 0
VISUAL CHANGE: 1
CHEST TIGHTNESS: 0
CONSTIPATION: 0
EYE REDNESS: 0

## 2021-02-04 NOTE — PROGRESS NOTES
[facility]  Neurology  [address] [address]  JRYSW:[phone]   Fax: [phone]    SUBJECTIVE:     PATIENT ID:  Ronald Antonio is a  RIGHT    HANDED 21 y.o. male. Migraine   This is a chronic problem. Episode onset: SINCE    AGE OF   6  YEARS. The problem occurs intermittently. The problem has been waxing and waning. The pain is located in the left unilateral region. The pain does not radiate. The pain quality is not similar to prior headaches. The quality of the pain is described as aching, stabbing, pulsating and throbbing. The pain is at a severity of 3/10. The pain is mild. Associated symptoms include dizziness, nausea, neck pain, phonophobia, photophobia and a visual change. Pertinent negatives include no abdominal pain, abnormal behavior, anorexia, back pain, blurred vision, coughing, drainage, ear pain, eye pain, eye redness, eye watering, facial sweating, fever, hearing loss, insomnia, loss of balance, muscle aches, numbness, rhinorrhea, scalp tenderness, seizures, sinus pressure, sore throat, swollen glands, tingling, tinnitus, vomiting, weakness or weight loss. The symptoms are aggravated by bright light, emotional stress and noise. Treatments tried: TOPAMAX. The treatment provided significant relief. His past medical history is significant for migraine headaches and recent head traumas. There is no history of cancer, cluster headaches, hypertension, immunosuppression, migraines in the family, obesity, pseudotumor cerebri, sinus disease or TMJ. History obtained from  The patient     and other  available   medical  records   were  Also  reviewed. The  Duration,  Quality,  Severity,  Location,  Timing,  Context,  Modifying  Factors   Of   The   Chief   Complaint       And  Present  Illness  Was   Reviewed   In   Chronological   Manner.                                                 PATIENT'S  MAIN  CONCERNS INCLUDE : 1)     H/O    CHRONIC  HEADACHES   SINCE  AGE OF   6   YEARS                                           TENSION     HEADACHES                             2)      H/O    CHRONIC   MIGRAINES  SINCE  AGE OF   6   YEARS                                     ASSOCIATED    WITH     DIZZINESS,    NAUSEA    AND  VOMITING                                              1- 2    TIMES     PER  WEEK     LASTING  FOR     4 - 5  HOURS                             2)       HAD   NEUROLOGY    EVALUATION      AT  [facility]     IN   2018                                    HAD   TRIED    TOPAMAX  IN    THE PAST     AND                                      IT  DID  NOT  HELP                                                      3)      CHRONIC      ANXIETY  ,   DEPRESSION                                          AND   ADHD    SINCE     AGE    5 - 6  YEARS                                PATIENT  TO  FOLLOW  WITH  MENTAL  HEALTH PROFESSION                           4)       FAMILY     H/O     MIGRAINES                           5)     PATIENT  DENIES  ANY    BIRTH  AND  DEVELOPMENTAL PROBLEMS                                NO     H/O   HEAD  INJURY  IN   THE PAST                                           6)    H/O     CHRONIC  SLEEP   DIFFICULTIES                            7)     CHRONIC  MILD  MEMORY PROBLEMS                             7)       PATIENT    HAD   NEURO  DIAGNOSTIC  EVALUATIONS  IN   SEPT. 2020                           A)       MRI  BRAIN  AND  EEG    DID  NOT  SHOW                                  ANY  SIGNIFICANT  ABNORMALITIES                            B)    LABS    IN  OCT. 2020       ARE    WITH IN  NORMAL  LIMITS                                         8)                               PATIENT      STARTED    ON       TOPAMAX,  IMITREX                                     TRAZODONE      IN    OCT.    2020     FOR                                 HIS  CHRONIC  HEADACHES  AND    MIGRAINES ,   SLEEP  DIFFICULTIES PATIENT  TOLERATING   THE   SAME      AND     GETTING   BENEFIT. 9)               EXPECTATIONS,   GOALS   AND  SIDE  EFFECTS  MEDICATIONS    WERE                                        REVIEWED     AND   DISCUSSED    IN    DETAIL. PATIENT   DENIES  ANY    NEW  NEUROLOGICAL  CONCERNS. PATIENT   REQUESTS   REFILLS  FOR  HIS  MEDICATIONS.                                                      PRECIPITATING  FACTORS: including  fever/infection, exertion/relaxation, position change, stress, weather change,                        medications/alcohol, time of day/darkness/light  Are  present                                                              MODIFYING  FACTORS:  fever/infection, exertion/relaxation, position change, stress, weather change,                        medications/alcohol, time of day/darkness/light  Are  present             Patient   Indicates   The  Presence   And  The  Absence  Of  The  Following    Associated  And   Additional  Neurological    Symptoms:                                Balance  And coordination   problems  absent           Gait problems     absent            Headaches      present              Migraines           present           Memory problemsabsent              Confusion        absent            Paresthesia   numbness          absent           Seizures  And  Starring  Episodes           absent           Syncope,  Near  syncopal episodes         absent           Speech   problems           absent             Swallowing   Problems      absent            Dizziness,  Light headedness           present              Vertigo        absent             Generalized   Weakness    absent              focal  Weakness     absent             Tremors         absent              Sleep  Problems     absent             History  Of   Recent  Head  Injury absent             History  Of   Recent  TIA     absent             History  Of   Recent    Stroke     absent             Neck  Pain   and   Neck muscle  Spasms  absent               Radiating  down   And   Weakness           absent            Lower back   Pain  And     Spasms  absent              Radiating    Down   And   Weakness          absent                H/OFALLS        absent               History  Of   Visual  Symptoms    absent                  Associated   Diplopia       absent                                               Also   Additional   Symptoms   Present    As  Documented    In   The   detailed                  Review  Of  Systems   And    Please   Refer   To    Them for   Additional    Information. Any components  That are either  Unobtainable  Or  Limited  In   HPI, ROS  And/or PFSH   Are                   Due   ToPatient's  Medical  Problems,  Clinical  Condition   and/or lack of                                other    Alternate   resources.           RECORDS   REVIEWED:    historical medical records       INFORMATION   REVIEWED:     MEDICAL   HISTORY,SURGICAL   HISTORY,     MEDICATIONS   LIST,   ALLERGIES AND  DRUG  INTOLERANCES,       FAMILY   HISTORY,  SOCIAL  HISTORY,      PROBLEM  LIST   FOR  PATIENT  CARE   COORDINATION      Past Medical History:   Diagnosis Date    ADHD (attention deficit hyperactivity disorder)     Anxiety     Depression     Epigastric pain     Headache     Weight gain          Past Surgical History:   Procedure Laterality Date    [facility]  03/27/2019    [facility]    HARDWARE REMOVAL  01/17/2019    LEG SURGERY Right 10/2017    UPPER GASTROINTESTINAL ENDOSCOPY      [facility]         Current Outpatient Medications   Medication Sig Dispense Refill    QUEtiapine (SEROQUEL XR) 50 MG extended release tablet Take 50 mg by mouth nightly      topiramate (TOPAMAX SPRINKLE) 25 MG capsule Take 1 capsule by mouth 2 times daily 60 capsule 2    SUMAtriptan (IMITREX) 100 MG tablet Take 1 tablet by mouth once as needed for Migraine 12 tablet 2    naproxen (EC-NAPROSYN) 500 MG EC tablet Take 1 tablet by mouth 2 times daily (with meals) AS  NEEDED 60 tablet 1    ondansetron (ZOFRAN ODT) 4 MG disintegrating tablet Take 1 tablet by mouth every 8 hours as needed for Nausea or Vomiting 30 tablet 1    traZODone (DESYREL) 100 MG tablet ONE  TABLET   AT  BEDTIME 30 tablet 2    albuterol sulfate HFA (PROVENTIL HFA) 108 (90 Base) MCG/ACT inhaler Inhale 2 puffs into the lungs      VENTOLIN  (90 Base) MCG/ACT inhaler inhale 2 puffs by mouth every 4 hours if needed  0    omeprazole (PRILOSEC) 20 MG delayed release capsule Take 20 mg by mouth daily      guanFACINE (TENEX) 1 MG tablet TAKE 1 TABLET BY MOUTH AT BEDTIME  0     No current facility-administered medications for this visit.           Allergies   Allergen Reactions    Dilaudid [Hydromorphone Hcl]     Prozac [Fluoxetine Hcl]     Zoloft [Sertraline Hcl]     Amoxicillin Rash         Family History   Problem Relation Age of Onset    High Blood Pressure Mother     Asthma Mother     COPD Mother     Arthritis Mother         Rhuematoid    Heart Disease Mother     Bipolar Disorder Mother     Heart Attack Maternal Grandmother     Early Death Maternal Grandmother 61    Heart Attack Maternal Grandfather     Early Death Maternal Grandfather 61    Heart Disease Paternal Grandmother     Diabetes Paternal Grandmother     Colon Cancer Maternal Aunt 54    Crohn's Disease Neg Hx     Ulcerative Colitis Neg Hx     Colon Polyps Neg Hx          Social History     Socioeconomic History    Marital status: Single     Spouse name: Not on file    Number of children: Not on file    Years of education: Not on file    Highest education level: Not on file   Occupational History    Not on file   Social Needs    Financial resource strain: Not on file    Food insecurity     Worry: Not on file Inability: Not on file    Transportation needs     Medical: Not on file     Non-medical: Not on file   Tobacco Use    Smoking status: Passive Smoke Exposure - Never Smoker    Smokeless tobacco: Never Used    Tobacco comment: Former user- for about 6 months   Substance and Sexual Activity    Alcohol use: No    Drug use: No    Sexual activity: Not on file   Lifestyle    Physical activity     Days per week: Not on file     Minutes per session: Not on file    Stress: Not on file   Relationships    Social connections     Talks on phone: Not on file     Gets together: Not on file     Attends Gnosticist service: Not on file     Active member of club or organization: Not on file     Attends meetings of clubs or organizations: Not on file     Relationship status: Not on file    Intimate partner violence     Fear of current or ex partner: Not on file     Emotionally abused: Not on file     Physically abused: Not on file     Forced sexual activity: Not on file   Other Topics Concern    Not on file   Social History Narrative    Not on file       Vitals:    02/04/21 0951   BP: 114/68   Pulse: 72   Resp: 14   SpO2: 98%         Wt Readings from Last 3 Encounters:   02/04/21 172 lb 12.8 oz (78.4 kg)   10/22/20 176 lb 3.2 oz (79.9 kg)   07/06/20 185 lb (83.9 kg)         BP Readings from Last 3 Encounters:   02/04/21 114/68   10/22/20 124/72   07/06/20 112/68             Review of Systems   Constitutional: Negative for appetite change, chills, fatigue, fever, unexpected weight change and weight loss. HENT: Negative for congestion, dental problem, drooling, ear discharge, ear pain, facial swelling, hearing loss, mouth sores, nosebleeds, postnasal drip, rhinorrhea, sinus pressure, sore throat, tinnitus, trouble swallowing and voice change. Eyes: Positive for photophobia. Negative for blurred vision, pain, discharge, redness, itching and visual disturbance.    Respiratory: Negative for apnea, cough, choking, chest tightness, shortness of breath and wheezing. Cardiovascular: Negative for chest pain, palpitations and leg swelling. Gastrointestinal: Positive for nausea. Negative for abdominal distention, abdominal pain, anorexia, blood in stool, constipation, diarrhea and vomiting. Endocrine: Negative for cold intolerance, heat intolerance, polydipsia, polyphagia and polyuria. Musculoskeletal: Positive for neck pain. Negative for arthralgias, back pain, gait problem, joint swelling, myalgias and neck stiffness. Skin: Negative for color change, pallor, rash and wound. Allergic/Immunologic: Negative for environmental allergies, food allergies and immunocompromised state. Neurological: Positive for dizziness and headaches. Negative for tingling, tremors, seizures, syncope, facial asymmetry, speech difficulty, weakness, light-headedness, numbness and loss of balance. Hematological: Negative for adenopathy. Does not bruise/bleed easily. Psychiatric/Behavioral: Positive for decreased concentration. Negative for agitation, behavioral problems, confusion, dysphoric mood, hallucinations, self-injury, sleep disturbance and suicidal ideas. The patient is nervous/anxious. The patient does not have insomnia and is not hyperactive. OBJECTIVE:    Physical Exam  Constitutional:       Appearance: He is well-developed. HENT:      Head: Normocephalic and atraumatic. No raccoon eyes or Gordon's sign. Right Ear: External ear normal.      Left Ear: External ear normal.      Nose: Nose normal.   Eyes:      Conjunctiva/sclera: Conjunctivae normal.      Pupils: Pupils are equal, round, and reactive to light. Neck:      Musculoskeletal: Normal range of motion and neck supple. Normal range of motion. No neck rigidity or muscular tenderness. Thyroid: No thyroid mass or thyromegaly. Vascular: No carotid bruit. Trachea: No tracheal deviation. Meningeal: Brudzinski's sign and Kernig's sign absent. problem       MRI OF THE BRAIN WITHOUT AND WITH CONTRAST  9/11/2020 1:15 pm         TECHNIQUE:    Multiplanar multisequence MRI of the head/brain was performed without and    with the administration of intravenous contrast.         COMPARISON:    None.         HISTORY:    ORDERING SYSTEM PROVIDED HISTORY: Chronic nonintractable headache,    unspecified headache type    TECHNOLOGIST PROVIDED HISTORY:    Reason for Exam:  Chronic migraine headaches, worse recently. Acuity: Chronic    Type of Exam: Unknown    Additional signs and symptoms: Chronic non intractable headache, unspecified    headache type; Migraine without aura and without status migrainosus, not    intractable; Dizziness.         FINDINGS:    Motion degrades images limiting evaluation.         INTRACRANIAL STRUCTURES/VENTRICLES: Trenton Toyin is no acute infarct. No mass    effect or midline shift. No evidence of an acute intracranial hemorrhage. The ventricles and sulci are normal in size and configuration.  The    sellar/suprasellar regions appear unremarkable.  The normal signal voids    within the major intracranial vessels appear maintained.  No abnormal focus    of enhancement is seen within the brain.         ORBITS: The visualized portion of the orbits demonstrate no acute abnormality.         SINUSES: Mild scattered mucosal thickening of the paranasal sinuses.  The    mastoid air cells demonstrate no acute abnormality.         BONES/SOFT TISSUES: The bone marrow signal intensity appears normal. The soft    tissues demonstrate no acute abnormality.              Impression    1. Patient motion partially limits evaluation. 2. Grossly unremarkable MRI of the brain. 3. Mild scattered mucosal thickening of the paranasal sinuses. VISITING DIAGNOSIS:            ICD-10-CM    1. Migraine without aura and without status migrainosus, not intractable  G43.009    2. Chronic tension-type headache, intractable  G44.221    3.  Sleep difficulties G47.9    4. Anxiety  F41.9    5. Electronic cigarette use  Z78.9    6. Memory problem  R41.3    7. Tension-type headache, not intractable, unspecified chronicity pattern  G44.209    8. Attention deficit hyperactivity disorder (ADHD), predominantly inattentive type  F90.0    9. Dizziness  R42    10. Current mild episode of major depressive disorder, unspecified whether recurrent ([address]  F32.0                     CONCERNS   &   INCREASED   RISK   FOR          *   POORLY  CONTROLLED   &  COMPLICATED  MIGRAINES      *    CHRONIC  TENSION  HEADACHES                 VARIOUS  RISK   FACTORS   WERE  REVIEWED   AND   DISCUSSED. PLAN:                         Lucendia Flurry  Of  The  Diagnoses,  The  Management & Treatment  Options            AND    Care  plan  Were          Reviewed and   Discussed   With  patient. * Goals  And  Expectations  Of  The  Therapy  Discussed   And  Reviewed. *   Benefits   And   Side  Effect  Profile  Of  Medication/s   Were   Discussed                * Need   For  Further   Follow up For  The  Various  Problems Were  discussed. * Results  Of  The  Previous  Diagnostic tests were reviewed and  discussed                 Medical  Decision  Making  Was  Made  Based on the   Complexity  Of  Above  Mentioned  Diagnoses,    Data reviewed             And    Risk  Of  Significant   Co morbidities and   complicating   Factors. Medical  Decision  Was   High  Complexity   Due   To  The  Patient's  Multiple  Symptoms,       Complex  Treatment  Regimen,  Multiple medications           and   Risk  Of   Side  Effects,  Difficulty  In  Medication  Management  And  Diagnostic  Challenges       In  View  Of  The  Associated   Co  Morbid  Conditions   And  Problems. *   BE  CAREFUL  WITH  ACTIVITIES   INCLUDING  DRIVING.               *   ADEQUATE   FLUIDINTAKE   AND  ELECTROLYTE  BALANCE             * KEEP  DAIRY  OF   THE  NEUROLOGICAL  SYMPTOMS RECORDING THE    DURATION  AND  FREQUENCY. *  AVOID    CONDITIONS  AND  FACTORS   THAT  MAKE                  NEUROLOGICAL  SYMPTOMS  WORSE.                  *TO  MAINTAIN  REGULAR  SLEEP  WAKE  CYCLES. *   TO  HAVE  ADEQUATE  REST  AND   SLEEP    HOURS.            *    AVOID  ANY USAGE OF    TOBACCO,              EXCESSIVE  ALCOHOL  AND   ILLEGAL   SUBSTANCES          *  CONTINUE   MEDICATIONS    PRESCRIBED     AS    RECOMMENDED       *   Compliance   With  Medications   And  Instructions          *    MIGRAINE/ HEADACHE    DAIRY   WITH  MONITORING                       OF  DURATION  AND  FREQUENCY. *    Prophylactic  Use   Of     Vitamin   B   Complex,  Folic  Acid,    Vitamin  B12    Multivitamin,       Calcium  With  magnesium  And  Vit D    Supplementations   Over  The  Counter  Discussed                      *         PATIENT      STARTED    ON       TOPAMAX,  IMITREX                                     TRAZODONE      IN    OCT. 2020     FOR                                 HIS  CHRONIC  HEADACHES  AND    MIGRAINES ,   SLEEP  DIFFICULTIES                                                     PATIENT  TOLERATING   THE   SAME      AND     GETTING   BENEFIT. *            EXPECTATIONS,   GOALS   AND  SIDE  EFFECTS  MEDICATIONS    WERE                                        REVIEWED     AND   DISCUSSED    IN    DETAIL. PATIENT   DENIES  ANY    NEW  NEUROLOGICAL  CONCERNS. PATIENT   REQUESTS   REFILLS  FOR  HIS  MEDICATIONS.                                   Orders Placed This Encounter   Medications    topiramate (TOPAMAX SPRINKLE) 25 MG capsule     Sig: Take 1 capsule by mouth 2 times daily     Dispense:  60 capsule     Refill:  2    SUMAtriptan (IMITREX) 100 MG tablet     Sig: Take 1 tablet by mouth once as needed for Migraine     Dispense:  12 tablet     Refill:  2    naproxen Current  Symptoms  And  Or                  If patient  Develops   Any additional  New  NeurologicalSymptoms                  Or  Significant  Concerns   Should  Call  911 or  Go  To  Emergency  Department  For  Further  Immediate  Evaluation. The   Above  Were  Reviewed  With  patient   and                       questions  Answered  In  Detail. More   Than  50% of face  To face Time   Was  Spent  On  Counseling                    And   Coordination  Of  Care   Of   Patient's  multiple   Neurological  Problems                         And   Comorbid  Medical   Conditions. Electronically signed by Luis Enrique Arnold MD.,  Zachary Oshea      Board Certified in  Neurology &  In  [facility] ([facility])      DISCLAIMER:   Although every effort was made to ensure the accuracy of this  electronictranscription, some errors in transcription may have occurred. GENERAL PATIENT INSTRUCTIONS:      A Healthy Lifestyle: Care Instructions   Your Care Instructions   A healthy lifestyle can help you feel good, stay at ahealthy weight, and have plenty of energy for both work and play. A healthy lifestyle is something you can share with your whole family.  A healthy lifestyle also can lower your risk for serious health problems, such ashigh blood pressure, heart disease, and diabetes.  You can follow a few steps listed below to improve your health and the health of your family.  Follow-up careis a key part of your treatment and safety. Be sure to make and go to all appointments, and call your doctor if you are having problems. Its also a good idea to know your test results and keep a list of the medicines you take.  How can you care for yourself at home?  Do not eat too much sugar, fat, or fast foods. You can still have dessert and treats nowand then. The goal is moderation.  Start small to improve your eating habits. Pay attention to portion sizes, drink less juice and soda pop, and eat more fruits and vegetables.  Eat a healthy amount of food. A 3-ounce serving of meat, for example, is about the size of a deck of cards. Fill the rest of your plate with vegetables and whole grains.  Limit theamount of soda and sports drinks you have every day. Drink more water when you are thirsty.  Eat at least 5 servings of fruits and vegetables every day. It may seem like a lot, but it is not hard to reach this goal. Aserving or helping is 1 piece of fruit, 1 cup of vegetables, or 2 cups of leafy, raw vegetables. Have an apple or some carrot sticks as an afternoon snack instead of a candy bar. Try to have fruits and/or vegetables at everymeal.   Make exercise part of your daily routine. You may want to start with simple activities, such as walking, bicycling, or slow swimming. Try carlos active 30 to 60 minutes every day. You do not need to do all 30 to 60 minutes all at once. For example, you can exercise 3 times a day for 10 or 20 minutes. Moderate exercise is safe for most people, but it is always agood idea to talk to your doctor before starting an exercise program.   Keep moving. Carly Murders the lawn, work in the garden, or [employer]. Take the stairs instead of the elevator at work.  If you smoke, quit. Peoplewho smoke have an increased risk for heart attack, stroke, cancer, and other lung illnesses. Quitting is hard, but there are ways to boost your chance of quitting tobacco for good.  Use nicotine gum, patches, or lozenges.  Ask your doctor about stop-smoking programs and medicines.  Keep trying.  In addition to reducing your risk of diseases in the future, you will notice some benefits soon after you stop using tobacco. If you have shortness of breath or asthma symptoms, they will likely getbetter within a few weeks after you quit.  Limit how much alcohol you drink.  Moderate amounts of alcohol (up to 2 drinks a day for men, 1drink a day for women) are okay. But drinking too much can lead to liver problems, high blood pressure, and other health problems.  health   If you have a family, there are many things you can do together to improve your health.  Eat meals together as a family as often as possible.  Eat healthy foods. This includes fruits, vegetables, lean meats and dairy, and whole grains.  Include your family in your fitness plan. Most peoplethink of activities such as jogging or tennis as the way to fitness, but there are many ways you and your family can be more active. Anything that makes you breathe hard and gets your heart pumping is exercise. Here are sometips:   Walk to do errands or to take your child to school or the bus.  Go for a family bike ride after dinner instead of watching TV.  Where can you learn more?  Go toIQ Enginestps://Cuff-[email or web address]. org and sign in to your [employer] account. Enter B374 in the Search HealthInformation box to learn more about \"A Healthy Lifestyle: Care Instructions. \"     If you do not have anaccount, please click on the \"Sign Up Now\" link.  Current as of: July 26, 2016   Content Version: 11.2   © 5349-4484 [employer]. Care instructions adapted under license by [facility] [place] ([facility]). If you have questions about a medical condition or this instruction, always ask your healthcare professional. [employer] disclaims any warranty or liability for your use of this information.

## 2021-05-12 ENCOUNTER — OFFICE VISIT (OUTPATIENT)
Dept: NEUROLOGY | Age: 21
End: 2021-05-12
Payer: COMMERCIAL

## 2021-05-12 VITALS
HEIGHT: 70 IN | DIASTOLIC BLOOD PRESSURE: 80 MMHG | BODY MASS INDEX: 25.77 KG/M2 | OXYGEN SATURATION: 98 % | HEART RATE: 110 BPM | SYSTOLIC BLOOD PRESSURE: 142 MMHG | WEIGHT: 180 LBS

## 2021-05-12 DIAGNOSIS — F90.0 ATTENTION DEFICIT HYPERACTIVITY DISORDER (ADHD), PREDOMINANTLY INATTENTIVE TYPE: ICD-10-CM

## 2021-05-12 DIAGNOSIS — R42 DIZZINESS: ICD-10-CM

## 2021-05-12 DIAGNOSIS — G47.9 SLEEP DIFFICULTIES: ICD-10-CM

## 2021-05-12 DIAGNOSIS — G43.009 MIGRAINE WITHOUT AURA AND WITHOUT STATUS MIGRAINOSUS, NOT INTRACTABLE: Primary | ICD-10-CM

## 2021-05-12 DIAGNOSIS — F32.0 CURRENT MILD EPISODE OF MAJOR DEPRESSIVE DISORDER, UNSPECIFIED WHETHER RECURRENT (HCC): ICD-10-CM

## 2021-05-12 DIAGNOSIS — F41.9 ANXIETY: ICD-10-CM

## 2021-05-12 DIAGNOSIS — G44.219 EPISODIC TENSION-TYPE HEADACHE, NOT INTRACTABLE: ICD-10-CM

## 2021-05-12 DIAGNOSIS — G44.221 CHRONIC TENSION-TYPE HEADACHE, INTRACTABLE: ICD-10-CM

## 2021-05-12 DIAGNOSIS — R41.3 MEMORY PROBLEM: ICD-10-CM

## 2021-05-12 PROCEDURE — G8427 DOCREV CUR MEDS BY ELIG CLIN: HCPCS | Performed by: PSYCHIATRY & NEUROLOGY

## 2021-05-12 PROCEDURE — 1036F TOBACCO NON-USER: CPT | Performed by: PSYCHIATRY & NEUROLOGY

## 2021-05-12 PROCEDURE — G8419 CALC BMI OUT NRM PARAM NOF/U: HCPCS | Performed by: PSYCHIATRY & NEUROLOGY

## 2021-05-12 PROCEDURE — 99214 OFFICE O/P EST MOD 30 MIN: CPT | Performed by: PSYCHIATRY & NEUROLOGY

## 2021-05-12 PROCEDURE — 99213 OFFICE O/P EST LOW 20 MIN: CPT | Performed by: PSYCHIATRY & NEUROLOGY

## 2021-05-12 RX ORDER — ONDANSETRON 4 MG/1
4 TABLET, ORALLY DISINTEGRATING ORAL EVERY 8 HOURS PRN
Qty: 30 TABLET | Refills: 1 | Status: SHIPPED | OUTPATIENT
Start: 2021-05-12 | End: 2021-08-12 | Stop reason: SDUPTHER

## 2021-05-12 RX ORDER — OMEPRAZOLE 20 MG/1
20 CAPSULE, DELAYED RELEASE ORAL DAILY
Qty: 30 CAPSULE | Refills: 2 | Status: SHIPPED | OUTPATIENT
Start: 2021-05-12 | End: 2021-08-12 | Stop reason: SDUPTHER

## 2021-05-12 RX ORDER — SUMATRIPTAN 100 MG/1
100 TABLET, FILM COATED ORAL
Qty: 12 TABLET | Refills: 2 | Status: SHIPPED | OUTPATIENT
Start: 2021-05-12 | End: 2021-08-12 | Stop reason: SDUPTHER

## 2021-05-12 RX ORDER — NAPROXEN 500 MG/1
500 TABLET ORAL 2 TIMES DAILY WITH MEALS
Qty: 60 TABLET | Refills: 1 | Status: SHIPPED | OUTPATIENT
Start: 2021-05-12 | End: 2021-08-12 | Stop reason: SDUPTHER

## 2021-05-12 RX ORDER — TRAZODONE HYDROCHLORIDE 100 MG/1
TABLET ORAL
Qty: 30 TABLET | Refills: 2 | Status: SHIPPED | OUTPATIENT
Start: 2021-05-12 | End: 2021-08-12 | Stop reason: SDUPTHER

## 2021-05-12 RX ORDER — TOPIRAMATE 100 MG/1
TABLET, FILM COATED ORAL
Qty: 60 TABLET | Refills: 2 | Status: SHIPPED | OUTPATIENT
Start: 2021-05-12 | End: 2021-08-12 | Stop reason: SDUPTHER

## 2021-05-12 ASSESSMENT — ENCOUNTER SYMPTOMS
EYE WATERING: 0
EYE PAIN: 0
SCALP TENDERNESS: 0
EYE DISCHARGE: 0
RHINORRHEA: 0
FACIAL SWELLING: 0
SORE THROAT: 0
APNEA: 0
VISUAL CHANGE: 1
ABDOMINAL PAIN: 0
PHOTOPHOBIA: 1
TROUBLE SWALLOWING: 0
SHORTNESS OF BREATH: 0
WHEEZING: 0
CHEST TIGHTNESS: 0
ABDOMINAL DISTENTION: 0
CONSTIPATION: 0
VOMITING: 0
SWOLLEN GLANDS: 0
BLURRED VISION: 0
SINUS PRESSURE: 0
VOICE CHANGE: 0
EYE REDNESS: 0
COUGH: 0
BACK PAIN: 0
BLOOD IN STOOL: 0
COLOR CHANGE: 0
EYE ITCHING: 0
DIARRHEA: 0
CHOKING: 0
FACIAL SWEATING: 0
NAUSEA: 1

## 2021-05-12 NOTE — PROGRESS NOTES
1)     H/O    CHRONIC  HEADACHES   SINCE  AGE OF   6   YEARS                                           TENSION     HEADACHES                               2)      H/O    CHRONIC   MIGRAINES  SINCE  AGE OF   6   YEARS                                     ASSOCIATED    WITH     DIZZINESS,    NAUSEA    AND  VOMITING                                              1- 2    TIMES     PER  WEEK     LASTING  FOR     4 - 5  HOURS                             2)       HAD   NEUROLOGY    EVALUATION      AT  [facility]     IN   2018                                    HAD   TRIED    TOPAMAX  IN    THE PAST     AND                                      IT  DID  NOT  HELP                                                        3)      CHRONIC      ANXIETY  ,   DEPRESSION                                          AND   ADHD    SINCE     AGE    5 - 6  YEARS                                PATIENT  TO  FOLLOW  WITH  MENTAL  HEALTH PROFESSION                           4)       FAMILY     H/O     MIGRAINES                           5)     PATIENT  DENIES  ANY    BIRTH  AND  DEVELOPMENTAL PROBLEMS                                NO     H/O   HEAD  INJURY  IN   THE PAST                                           6)    H/O     CHRONIC  SLEEP   DIFFICULTIES                                  -  BETTER  ON  TRAZODONE                           7)     CHRONIC  MILD  MEMORY PROBLEMS                                       -    NEEDS   MONITORING                             7)       PATIENT    HAD   NEURO  DIAGNOSTIC  EVALUATIONS  IN   SEPT. 2020                             A)       MRI  BRAIN  AND  EEG    DID  NOT  SHOW                                  ANY  SIGNIFICANT  ABNORMALITIES                            B)    LABS    IN  OCT.   2020       ARE    WITH IN  NORMAL  LIMITS                                         8)                               PATIENT      STARTED    ON       TOPAMAX,  IMITREX                                     TRAZODONE      IN OCT.   2020     FOR                                 HIS  CHRONIC  HEADACHES  AND    MIGRAINES ,   SLEEP  DIFFICULTIES                                                     PATIENT  TOLERATING   THE   SAME      AND     GETTING   BENEFIT. 9)               EXPECTATIONS,   GOALS   AND  SIDE  EFFECTS  MEDICATIONS    WERE                                        REVIEWED     AND   DISCUSSED    IN    DETAIL. PATIENT   DENIES  ANY    NEW  NEUROLOGICAL  CONCERNS. PATIENT   REQUESTS   REFILLS  FOR  HIS  MEDICATIONS.                                                      PRECIPITATING  FACTORS: including  fever/infection, exertion/relaxation, position change, stress, weather change,                        medications/alcohol, time of day/darkness/light  Are  present                                                              MODIFYING  FACTORS:  fever/infection, exertion/relaxation, position change, stress, weather change,                        medications/alcohol, time of day/darkness/light  Are  present             Patient   Indicates   The  Presence   And  The  Absence  Of  The  Following    Associated  And       Additional  Neurological    Symptoms:                                Balance  And coordination   problems  absent           Gait problems     absent            Headaches      present              Migraines           present           Memory problemsabsent              Confusion        absent            Paresthesia   numbness          absent           Seizures  And  Starring  Episodes           absent           Syncope,  Near  syncopal episodes         absent           Speech   problems           absent             Swallowing   Problems      absent            Dizziness,  Light headedness           present              Vertigo        absent             Generalized   Weakness    absent              focal  Weakness release tablet Take 50 mg by mouth nightly      topiramate (TOPAMAX SPRINKLE) 25 MG capsule Take 1 capsule by mouth 2 times daily 60 capsule 2    SUMAtriptan (IMITREX) 100 MG tablet Take 1 tablet by mouth once as needed for Migraine 12 tablet 2    naproxen (EC-NAPROSYN) 500 MG EC tablet Take 1 tablet by mouth 2 times daily (with meals) AS  NEEDED 60 tablet 1    ondansetron (ZOFRAN ODT) 4 MG disintegrating tablet Take 1 tablet by mouth every 8 hours as needed for Nausea or Vomiting 30 tablet 1    traZODone (DESYREL) 100 MG tablet ONE  TABLET   AT  BEDTIME 30 tablet 2    albuterol sulfate HFA (PROVENTIL HFA) 108 (90 Base) MCG/ACT inhaler Inhale 2 puffs into the lungs      VENTOLIN  (90 Base) MCG/ACT inhaler inhale 2 puffs by mouth every 4 hours if needed  0    guanFACINE (TENEX) 1 MG tablet TAKE 1 TABLET BY MOUTH AT BEDTIME  0    omeprazole (PRILOSEC) 20 MG delayed release capsule Take 20 mg by mouth daily       No current facility-administered medications for this visit.           Allergies   Allergen Reactions    Dilaudid [Hydromorphone Hcl]     Prozac [Fluoxetine Hcl]     Zoloft [Sertraline Hcl]     Amoxicillin Rash         Family History   Problem Relation Age of Onset    High Blood Pressure Mother     Asthma Mother     COPD Mother     Arthritis Mother         Rhuematoid    Heart Disease Mother     Bipolar Disorder Mother     Heart Attack Maternal Grandmother     Early Death Maternal Grandmother 61    Heart Attack Maternal Grandfather     Early Death Maternal Grandfather 61    Heart Disease Paternal Grandmother     Diabetes Paternal Grandmother     Colon Cancer Maternal Aunt 54    Crohn's Disease Neg Hx     Ulcerative Colitis Neg Hx     Colon Polyps Neg Hx          Social History     Socioeconomic History    Marital status: Single     Spouse name: Not on file    Number of children: Not on file    Years of education: Not on file    Highest education level: Not on file Occupational History    Not on file   Social Needs    Financial resource strain: Not on file    Food insecurity     Worry: Not on file     Inability: Not on file    Transportation needs     Medical: Not on file     Non-medical: Not on file   Tobacco Use    Smoking status: Passive Smoke Exposure - Never Smoker    Smokeless tobacco: Never Used    Tobacco comment: Former user- for about 6 months   Substance and Sexual Activity    Alcohol use: No    Drug use: No    Sexual activity: Not on file   Lifestyle    Physical activity     Days per week: Not on file     Minutes per session: Not on file    Stress: Not on file   Relationships    Social connections     Talks on phone: Not on file     Gets together: Not on file     Attends Jew service: Not on file     Active member of club or organization: Not on file     Attends meetings of clubs or organizations: Not on file     Relationship status: Not on file    Intimate partner violence     Fear of current or ex partner: Not on file     Emotionally abused: Not on file     Physically abused: Not on file     Forced sexual activity: Not on file   Other Topics Concern    Not on file   Social History Narrative    Not on file       There were no vitals filed for this visit. Wt Readings from Last 3 Encounters:   02/04/21 172 lb 12.8 oz (78.4 kg)   10/22/20 176 lb 3.2 oz (79.9 kg)   07/06/20 185 lb (83.9 kg)         BP Readings from Last 3 Encounters:   02/04/21 114/68   10/22/20 124/72   07/06/20 112/68             Review of Systems   Constitutional: Negative for appetite change, chills, fatigue, fever, unexpected weight change and weight loss. HENT: Negative for congestion, dental problem, drooling, ear discharge, ear pain, facial swelling, hearing loss, mouth sores, nosebleeds, postnasal drip, rhinorrhea, sinus pressure, sore throat, tinnitus, trouble swallowing and voice change. Eyes: Positive for photophobia.  Negative for blurred vision, pain, discharge, redness, itching and visual disturbance. Respiratory: Negative for apnea, cough, choking, chest tightness, shortness of breath and wheezing. Cardiovascular: Negative for chest pain, palpitations and leg swelling. Gastrointestinal: Positive for nausea. Negative for abdominal distention, abdominal pain, anorexia, blood in stool, constipation, diarrhea and vomiting. Endocrine: Negative for cold intolerance, heat intolerance, polydipsia, polyphagia and polyuria. Musculoskeletal: Positive for neck pain. Negative for arthralgias, back pain, gait problem, joint swelling, myalgias and neck stiffness. Skin: Negative for color change, pallor, rash and wound. Allergic/Immunologic: Negative for environmental allergies, food allergies and immunocompromised state. Neurological: Positive for dizziness and headaches. Negative for tingling, tremors, seizures, syncope, facial asymmetry, speech difficulty, weakness, light-headedness, numbness and loss of balance. Hematological: Negative for adenopathy. Does not bruise/bleed easily. Psychiatric/Behavioral: Positive for decreased concentration. Negative for agitation, behavioral problems, confusion, dysphoric mood, hallucinations, self-injury, sleep disturbance and suicidal ideas. The patient is nervous/anxious. The patient does not have insomnia and is not hyperactive. OBJECTIVE:    Physical Exam  Constitutional:       Appearance: He is well-developed. HENT:      Head: Normocephalic and atraumatic. No raccoon eyes or Gordon's sign. Right Ear: External ear normal.      Left Ear: External ear normal.      Nose: Nose normal.   Eyes:      Conjunctiva/sclera: Conjunctivae normal.      Pupils: Pupils are equal, round, and reactive to light. Neck:      Musculoskeletal: Normal range of motion and neck supple. Normal range of motion. No neck rigidity or muscular tenderness. Thyroid: No thyroid mass or thyromegaly.       Vascular: No carotid bruit.      Trachea: No tracheal deviation. Meningeal: Brudzinski's sign and Kernig's sign absent. Cardiovascular:      Rate and Rhythm: Normal rate and regular rhythm. Pulmonary:      Effort: Pulmonary effort is normal.   Musculoskeletal: Normal range of motion. General: No tenderness. Skin:     General: Skin is warm. Coloration: Skin is not pale. Findings: No erythema or rash. Nails: There is no clubbing. Psychiatric:         Attention and Perception: He is attentive. Mood and Affect: Mood is anxious. Mood is not depressed. Affect is not labile, blunt or inappropriate. Speech: He is communicative. Speech is not rapid and pressured, delayed, slurred or tangential.         Behavior: Behavior is not agitated, slowed, aggressive, withdrawn, hyperactive or combative. Behavior is cooperative. Thought Content: Thought content is not paranoid or delusional. Thought content does not include homicidal or suicidal ideation. Thought content does not include homicidal or suicidal plan. Cognition and Memory: Memory is not impaired. He does not exhibit impaired recent memory or impaired remote memory. Judgment: Judgment is not impulsive or inappropriate. NEUROLOGICALEXAMINATION :       A) MENTAL STATUS:                   Alert and  oriented  To time, place  And  Person. No Aphasia. No  Dysarthria. Able   To  Follow   THREE    Stepcommands   without   Any  Difficulty. No right  To left confusion. Normal  Speech  And language function.                  Insight and  Judgment ,Fund  Of  Knowledge   within normal limits                Recent  And  Remote memory  within   normal limits                Attention &  Concentration are within   normal limits                                                 B) CRANIAL NERVES :      CN : Visual  Acuity  And  Visual fields  within normal limits               Fundi  Could  Not  Be  Could  Not  Be  Evaluated. 3,4,6 CN : Both  Pupils are   Reactive and  Equal.  Movements  Are  Intact. No  Nystagmus. No  MANUEL. No  Afferent  Pupillary  Defect noted. 5 CN :  Normal  Facial sensations and Corneal  Reflexes           7 CN:  Normal  Facial  Symmetry  And  Strength. No facial  Weakness. 8 CN :  Hearing  Appears within normal limits          9, 10 CN: Normal   spontaneous, reflex   palate   movements         11 CN:   Normal  Shoulder  shrug and  strength         12 CN :   Normal  Tongue movements and  Tongue  In midline                        No tongue   Fasciculations or atrophy       C) MOTOR  EXAM:                 Strength  In upper  And  Lower   extremities   within   normal limits               No  Drift. No  Atrophy               Rapid   alternating  And  repetitions  Movements  within   normal limits               Muscle  Tone  In upper  And  Lower  Extremities  normal                No rigidity. No  Spasticity. Bradykinesia   absent               No  Asterixis. Sustention  Tremor , Resting   Tremor   absent                    No   other  Abnormal  Movements noted           D) SENSORY :               Light   touch, pinprick,   position  And  Vibration  within normal limits        E) REFLEXES:                   Deep  Tendon  Reflexes   normal                  No  pathological  Reflexes  Bilaterally.                                   F) COORDINATION  AND  GAIT :                                Station and  Gait  normal                              Romberg 's test negative                          Ataxia negative      ASSESSMENT:        Patient Active Problem List   Diagnosis    Electronic cigarette use    ADHD (attention deficit hyperactivity disorder)    Anxiety    Depression    Headache    Migraine without aura and without status migrainosus, not intractable  Dizziness    Sleep difficulties    Chronic tension-type headache, intractable    Memory problem       MRI OF THE BRAIN WITHOUT AND WITH CONTRAST  9/11/2020 1:15 pm         TECHNIQUE:    Multiplanar multisequence MRI of the head/brain was performed without and    with the administration of intravenous contrast.         COMPARISON:    None.         HISTORY:    ORDERING SYSTEM PROVIDED HISTORY: Chronic nonintractable headache,    unspecified headache type    TECHNOLOGIST PROVIDED HISTORY:    Reason for Exam:  Chronic migraine headaches, worse recently. Acuity: Chronic    Type of Exam: Unknown    Additional signs and symptoms: Chronic non intractable headache, unspecified    headache type; Migraine without aura and without status migrainosus, not    intractable; Dizziness.         FINDINGS:    Motion degrades images limiting evaluation.         INTRACRANIAL STRUCTURES/VENTRICLES: Natali Lolling is no acute infarct. No mass    effect or midline shift. No evidence of an acute intracranial hemorrhage. The ventricles and sulci are normal in size and configuration.  The    sellar/suprasellar regions appear unremarkable.  The normal signal voids    within the major intracranial vessels appear maintained.  No abnormal focus    of enhancement is seen within the brain.         ORBITS: The visualized portion of the orbits demonstrate no acute abnormality.         SINUSES: Mild scattered mucosal thickening of the paranasal sinuses.  The    mastoid air cells demonstrate no acute abnormality.         BONES/SOFT TISSUES: The bone marrow signal intensity appears normal. The soft    tissues demonstrate no acute abnormality.              Impression    1. Patient motion partially limits evaluation. 2. Grossly unremarkable MRI of the brain. 3. Mild scattered mucosal thickening of the paranasal sinuses. VISITING DIAGNOSIS:            ICD-10-CM    1.  Migraine without aura and without status migrainosus, not intractable  G43.009    2. Sleep difficulties  G47.9    3. Chronic tension-type headache, intractable  G44.221    4. Anxiety  F41.9    5. Attention deficit hyperactivity disorder (ADHD), predominantly inattentive type  F90.0    6. Dizziness  R42    7. Current mild episode of major depressive disorder, unspecified whether recurrent ([facility] [address]  F32.0    8. Memory problem  R41.3    9. Episodic tension-type headache, not intractable  G44.219                     CONCERNS   &   INCREASED   RISK   FOR          *   POORLY  CONTROLLED   &  COMPLICATED  MIGRAINES      *    CHRONIC  TENSION  HEADACHES                 VARIOUS  RISK   FACTORS   WERE  REVIEWED   AND   DISCUSSED. PLAN:                         Ni Foley  Of  The  Diagnoses,  The  Management & Treatment  Options            AND    Care  plan  Were          Reviewed and   Discussed   With  patient. * Goals  And  Expectations  Of  The  Therapy  Discussed   And  Reviewed. *   Benefits   And   Side  Effect  Profile  Of  Medication/s   Were   Discussed                * Need   For  Further   Follow up For  The  Various  Problems Were  discussed. * Results  Of  The  Previous  Diagnostic tests were reviewed and  discussed                 Medical  Decision  Making  Was  Made  Based on the   Complexity  Of  Above  Mentioned  Diagnoses,    Data reviewed             And    Risk  Of  Significant   Co morbidities and   complicating   Factors. Medical  Decision  Was   High  Complexity   Due   To  The  Patient's  Multiple  Symptoms,       Complex  Treatment  Regimen,  Multiple medications           and   Risk  Of   Side  Effects,  Difficulty  In  Medication  Management  And  Diagnostic  Challenges       In  View  Of  The  Associated   Co  Morbid  Conditions   And  Problems. *   BE  CAREFUL  WITH  ACTIVITIES   INCLUDING  DRIVING.               *   ADEQUATE   FLUIDINTAKE   AND  ELECTROLYTE  BALANCE             * KEEP  DAIRY OF   THE  NEUROLOGICAL  SYMPTOMS        RECORDING THE    DURATION  AND  FREQUENCY. *  AVOID    CONDITIONS  AND  FACTORS   THAT  MAKE                  NEUROLOGICAL  SYMPTOMS  WORSE.                  *TO  MAINTAIN  REGULAR  SLEEP  WAKE  CYCLES. *   TO  HAVE  ADEQUATE  REST  AND   SLEEP    HOURS.            *    AVOID  ANY USAGE OF    TOBACCO,              EXCESSIVE  ALCOHOL  AND   ILLEGAL   SUBSTANCES          *  CONTINUE   MEDICATIONS    PRESCRIBED     AS    RECOMMENDED       *   Compliance   With  Medications   And  Instructions          *    MIGRAINE/ HEADACHE    DAIRY   WITH  MONITORING                       OF  DURATION  AND  FREQUENCY. *    Prophylactic  Use   Of     Vitamin   B   Complex,  Folic  Acid,    Vitamin  B12    Multivitamin,       Calcium  With  magnesium  And  Vit D    Supplementations   Over  The  Counter  Discussed                      *         PATIENT      STARTED    ON       TOPAMAX,  IMITREX                                     TRAZODONE      IN    OCT. 2020     FOR                                 HIS  CHRONIC  HEADACHES  AND    MIGRAINES ,   SLEEP  DIFFICULTIES                                                     PATIENT  TOLERATING   THE   SAME      AND     GETTING   BENEFIT. *            EXPECTATIONS,   GOALS   AND  SIDE  EFFECTS  MEDICATIONS    WERE                                        REVIEWED     AND   DISCUSSED    IN    DETAIL. PATIENT   DENIES  ANY    NEW  NEUROLOGICAL  CONCERNS. PATIENT   REQUESTS   REFILLS  FOR  HIS  MEDICATIONS.                                     Orders Placed This Encounter   Medications    topiramate (TOPAMAX) 100 MG tablet     Si/2   TABLET   TWICE  DAILY     FOR      TWO    WEEKS,     THEN      ONE  TABLET   TWICE  DAILY     Dispense:  60 tablet     Refill:  2    SUMAtriptan (IMITREX) 100 MG tablet     Sig: Take 1 tablet by mouth once as needed for Migraine     Dispense:  12 tablet     Refill:  2    naproxen (EC-NAPROSYN) 500 MG EC tablet     Sig: Take 1 tablet by mouth 2 times daily (with meals) AS  NEEDED     Dispense:  60 tablet     Refill:  1    ondansetron (ZOFRAN ODT) 4 MG disintegrating tablet     Sig: Take 1 tablet by mouth every 8 hours as needed for Nausea or Vomiting     Dispense:  30 tablet     Refill:  1    omeprazole (PRILOSEC) 20 MG delayed release capsule     Sig: Take 1 capsule by mouth daily     Dispense:  30 capsule     Refill:  2    traZODone (DESYREL) 100 MG tablet     Sig: ONE  TABLET   AT  BEDTIME     Dispense:  30 tablet     Refill:  2                   *PATIENT   TO  FOLLOW  UP  WITH   PRIMARY  CARE         OTHER  CONSULTANTS  AS  BEFORE.           *TO  FOLLOW  WITH   MENTAL  HEALTH  PROFESSIONALS ,  INCLUDING            PSYCHOLOGICAL  COUNSELING   AND  PSYCHIATRIC  EVALUTIONS,                   *  Maintain   Healthy  Life Style    With   Periodic  Monitoring  Of      Any  Medical  Conditions  Including   Elevated  Blood  Pressure,  Lipid  Profile,     Blood  Sugar levels  AndHeart  Disease. *   Period   Screening  For  Cancers  Involving  Breast,  Colon,    lungs  And  Other  Organs  As  Applicable,  In consultation   With  Your  Primary Care Providers. *Second  Neurological  Opinion  And  Evaluations  In  [facility]  Setting  If  Patient  Is  Interested. * Please   Contact   Neurology  Clinic   Early   If   Are  Any  New  Neurological   And  Any neurological  Concerns.                   *  If  The  Patient remains  Neurologically  Stable   Return   To  [facility] Neurology Department   IN    3      MONTHS  TIME   FOR  FURTHER              FOLLOW UP.                       *   The  Neurological   Findings,  Possible  Diagnosis,  Differential diagnoses                    And  Options  For    Further   Investigations And  management   Are  Discussed  Comprehensively. Medications   And  Prescription   Risks  And  Side effects  Are   Also  Discussed. *  If   There is  Any  Significant  Worsening   Of  Current  Symptoms  And  Or                  If patient  Develops   Any additional  New  NeurologicalSymptoms                  Or  Significant  Concerns   Should  Call  911 or  Go  To  Emergency  Department  For  Further  Immediate  Evaluation. The   Above  Were  Reviewed  With  patient   and                       questions  Answered  In  Detail. More   Than  50% of face  To face Time   Was  Spent  On  Counseling                    And   Coordination  Of  Care   Of   Patient's  multiple   Neurological  Problems                         And   Comorbid  Medical   Conditions. Electronically signed by Linda Stokes MD.,  [facility]      Board Certified in  Neurology &  In  [employer] [address] of [employer] and Neurology ([employer])      DISCLAIMER:   Although every effort was made to ensure the accuracy of this  electronictranscription, some errors in transcription may have occurred. GENERAL PATIENT INSTRUCTIONS:      A Healthy Lifestyle: Care Instructions   Your Care Instructions   A healthy lifestyle can help you feel good, stay at ahealthy weight, and have plenty of energy for both work and play. A healthy lifestyle is something you can share with your whole family.  A healthy lifestyle also can lower your risk for serious health problems, such ashigh blood pressure, heart disease, and diabetes.  You can follow a few steps listed below to improve your health and the health of your family.  Follow-up careis a key part of your treatment and safety. Be sure to make and go to all appointments, and call your doctor if you are having problems.  Its also a good idea to know your test results and keep a list of the after you stop using tobacco. If you have shortness of breath or asthma symptoms, they will likely getbetter within a few weeks after you quit.  Limit how much alcohol you drink. Moderate amounts of alcohol (up to 2 drinks a day for men, 1drink a day for women) are okay. But drinking too much can lead to liver problems, high blood pressure, and other health problems.  health   If you have a family, there are many things you can do together to improve your health.  Eat meals together as a family as often as possible.  Eat healthy foods. This includes fruits, vegetables, lean meats and dairy, and whole grains.  Include your family in your fitness plan. Most peoplethink of activities such as jogging or tennis as the way to fitness, but there are many ways you and your family can be more active. Anything that makes you breathe hard and gets your heart pumping is exercise. Here are sometips:   Walk to do errands or to take your child to school or the bus.  Go for a family bike ride after dinner instead of watching TV.  Where can you learn more?  Go [email or web address] and sign in to your [employer] account. Enter M748 in the Search HealthInformation box to learn more about \"A Healthy Lifestyle: Care Instructions. \"     If you do not have anaccount, please click on the \"Sign Up Now\" link.  Current as of: July 26, 2016   Content Version: 11.2   © [id number] [employer]. Care instructions adapted under license by [facility] ([facility]). If you have questions about a medical condition or this instruction, always ask your healthcare professional. [employer] disclaims any warranty or liability for your use of this information.

## 2021-08-12 ENCOUNTER — OFFICE VISIT (OUTPATIENT)
Dept: NEUROLOGY | Age: 21
End: 2021-08-12
Payer: COMMERCIAL

## 2021-08-12 VITALS
SYSTOLIC BLOOD PRESSURE: 138 MMHG | HEIGHT: 70 IN | OXYGEN SATURATION: 99 % | DIASTOLIC BLOOD PRESSURE: 82 MMHG | HEART RATE: 91 BPM | BODY MASS INDEX: 25.62 KG/M2 | WEIGHT: 179 LBS

## 2021-08-12 DIAGNOSIS — G47.9 SLEEP DIFFICULTIES: ICD-10-CM

## 2021-08-12 DIAGNOSIS — G43.009 MIGRAINE WITHOUT AURA AND WITHOUT STATUS MIGRAINOSUS, NOT INTRACTABLE: Primary | ICD-10-CM

## 2021-08-12 DIAGNOSIS — G44.219 EPISODIC TENSION-TYPE HEADACHE, NOT INTRACTABLE: ICD-10-CM

## 2021-08-12 DIAGNOSIS — R41.3 MEMORY PROBLEM: ICD-10-CM

## 2021-08-12 DIAGNOSIS — F90.0 ATTENTION DEFICIT HYPERACTIVITY DISORDER (ADHD), PREDOMINANTLY INATTENTIVE TYPE: ICD-10-CM

## 2021-08-12 DIAGNOSIS — G44.221 CHRONIC TENSION-TYPE HEADACHE, INTRACTABLE: ICD-10-CM

## 2021-08-12 DIAGNOSIS — G44.1 OTHER VASCULAR HEADACHE: ICD-10-CM

## 2021-08-12 DIAGNOSIS — R42 DIZZINESS: ICD-10-CM

## 2021-08-12 DIAGNOSIS — F41.9 ANXIETY: ICD-10-CM

## 2021-08-12 PROCEDURE — 99213 OFFICE O/P EST LOW 20 MIN: CPT | Performed by: PSYCHIATRY & NEUROLOGY

## 2021-08-12 PROCEDURE — G8427 DOCREV CUR MEDS BY ELIG CLIN: HCPCS | Performed by: PSYCHIATRY & NEUROLOGY

## 2021-08-12 PROCEDURE — 1036F TOBACCO NON-USER: CPT | Performed by: PSYCHIATRY & NEUROLOGY

## 2021-08-12 PROCEDURE — G8419 CALC BMI OUT NRM PARAM NOF/U: HCPCS | Performed by: PSYCHIATRY & NEUROLOGY

## 2021-08-12 PROCEDURE — 99214 OFFICE O/P EST MOD 30 MIN: CPT | Performed by: PSYCHIATRY & NEUROLOGY

## 2021-08-12 RX ORDER — UBROGEPANT 100 MG/1
100 TABLET ORAL 2 TIMES DAILY PRN
Qty: 30 TABLET | Refills: 1 | Status: SHIPPED | OUTPATIENT
Start: 2021-08-12 | End: 2022-01-27

## 2021-08-12 RX ORDER — TOPIRAMATE 100 MG/1
TABLET, FILM COATED ORAL
Qty: 60 TABLET | Refills: 2 | Status: SHIPPED | OUTPATIENT
Start: 2021-08-12 | End: 2021-10-21 | Stop reason: SDUPTHER

## 2021-08-12 RX ORDER — TRAZODONE HYDROCHLORIDE 100 MG/1
TABLET ORAL
Qty: 30 TABLET | Refills: 2 | Status: SHIPPED | OUTPATIENT
Start: 2021-08-12 | End: 2021-10-21 | Stop reason: SDUPTHER

## 2021-08-12 RX ORDER — ONDANSETRON 4 MG/1
4 TABLET, ORALLY DISINTEGRATING ORAL EVERY 8 HOURS PRN
Qty: 30 TABLET | Refills: 1 | Status: SHIPPED | OUTPATIENT
Start: 2021-08-12 | End: 2021-10-21 | Stop reason: SDUPTHER

## 2021-08-12 RX ORDER — SUMATRIPTAN 100 MG/1
100 TABLET, FILM COATED ORAL
Qty: 12 TABLET | Refills: 2 | Status: SHIPPED | OUTPATIENT
Start: 2021-08-12 | End: 2021-10-21 | Stop reason: SDUPTHER

## 2021-08-12 RX ORDER — OMEPRAZOLE 20 MG/1
20 CAPSULE, DELAYED RELEASE ORAL DAILY
Qty: 30 CAPSULE | Refills: 2 | Status: SHIPPED | OUTPATIENT
Start: 2021-08-12 | End: 2021-10-21 | Stop reason: SDUPTHER

## 2021-08-12 RX ORDER — NAPROXEN 500 MG/1
500 TABLET ORAL 2 TIMES DAILY WITH MEALS
Qty: 60 TABLET | Refills: 1 | Status: SHIPPED | OUTPATIENT
Start: 2021-08-12 | End: 2021-10-21 | Stop reason: SDUPTHER

## 2021-08-12 ASSESSMENT — ENCOUNTER SYMPTOMS
ABDOMINAL PAIN: 0
VOMITING: 0
ABDOMINAL DISTENTION: 0
TROUBLE SWALLOWING: 0
CHOKING: 0
SCALP TENDERNESS: 0
EYE DISCHARGE: 0
EYE REDNESS: 0
PHOTOPHOBIA: 1
COLOR CHANGE: 0
FACIAL SWEATING: 0
SHORTNESS OF BREATH: 0
BLURRED VISION: 0
APNEA: 0
VISUAL CHANGE: 1
EYE WATERING: 0
CHEST TIGHTNESS: 0
EYE ITCHING: 0
NAUSEA: 1
SINUS PRESSURE: 0
FACIAL SWELLING: 0
SWOLLEN GLANDS: 0
WHEEZING: 0
RHINORRHEA: 0
BLOOD IN STOOL: 0
VOICE CHANGE: 0
CONSTIPATION: 0
SORE THROAT: 0
DIARRHEA: 0
COUGH: 0
BACK PAIN: 0
EYE PAIN: 0

## 2021-08-12 NOTE — PROGRESS NOTES
[facility]  Neurology  [address] [place] [address]  RPIDK:[phone]   Fax: [phone]    SUBJECTIVE:     PATIENT ID:  Courtney Eisenberg is a  RIGHT    HANDED 24 y.o. male. Migraine   This is a chronic problem. Episode onset: SINCE    AGE OF   6  YEARS. The problem occurs intermittently. The problem has been waxing and waning. The pain is located in the left unilateral region. The pain does not radiate. The pain quality is not similar to prior headaches. The quality of the pain is described as aching, stabbing, pulsating and throbbing. The pain is at a severity of 3/10. The pain is mild. Associated symptoms include dizziness, nausea, neck pain, phonophobia, photophobia and a visual change. Pertinent negatives include no abdominal pain, abnormal behavior, anorexia, back pain, blurred vision, coughing, drainage, ear pain, eye pain, eye redness, eye watering, facial sweating, fever, hearing loss, insomnia, loss of balance, muscle aches, numbness, rhinorrhea, scalp tenderness, seizures, sinus pressure, sore throat, swollen glands, tingling, tinnitus, vomiting, weakness or weight loss. The symptoms are aggravated by bright light, emotional stress and noise. Treatments tried: TOPAMAX. The treatment provided significant relief. His past medical history is significant for migraine headaches and recent head traumas. There is no history of cancer, cluster headaches, hypertension, immunosuppression, migraines in the family, obesity, pseudotumor cerebri, sinus disease or TMJ. History obtained from  The patient     and other  available   medical  records   were  Also  reviewed. The  Duration,  Quality,  Severity,  Location,  Timing,  Context,  Modifying  Factors   Of   The   Chief   Complaint       And  Present  Illness  Was   Reviewed   In   Chronological   Manner.                                                 PATIENT'S  MAIN  CONCERNS INCLUDE : 1)     H/O    CHRONIC  HEADACHES   SINCE  AGE OF   6   YEARS                                          -- TENSION     HEADACHES                               2)      H/O    CHRONIC   MIGRAINES  SINCE  AGE OF   6   YEARS                                     ASSOCIATED    WITH     DIZZINESS,    NAUSEA    AND  VOMITING                                              1- 2    TIMES     PER  WEEK     LASTING  FOR     4 - 5  HOURS                             2)       HAD   NEUROLOGY    EVALUATION      AT  [facility]     IN   2018                                    HAD   TRIED    TOPAMAX  IN    THE PAST     AND                                      IT  DID  NOT  HELP                                                        3)      CHRONIC      ANXIETY  ,   DEPRESSION                                          AND   ADHD    SINCE     AGE    5 - 6  YEARS                                PATIENT  TO  FOLLOW  WITH  MENTAL  HEALTH PROFESSION                           4)       FAMILY     H/O     MIGRAINES                           5)     PATIENT  DENIES  ANY    BIRTH  AND  DEVELOPMENTAL PROBLEMS                                NO     H/O   HEAD  INJURY  IN   THE PAST                                           6)    H/O     CHRONIC  SLEEP   DIFFICULTIES                                  -  BETTER  ON  TRAZODONE                           7)     CHRONIC  MILD  MEMORY PROBLEMS                                       -    NEEDS   MONITORING                             7)       PATIENT    HAD   NEURO  DIAGNOSTIC  EVALUATIONS  IN   SEPT. 2020                             A)       MRI  BRAIN  AND  EEG    DID  NOT  SHOW                                  ANY  SIGNIFICANT  ABNORMALITIES                            B)    LABS    IN  OCT. 2020       ARE    WITH IN  NORMAL  LIMITS                                         8)      PATIENT      STARTED    ON       TOPAMAX,  IMITREX                                     TRAZODONE      IN    OCT.    2020     FOR HIS  CHRONIC  HEADACHES  AND    MIGRAINES ,   SLEEP  DIFFICULTIES                                                     PATIENT  TOLERATING   THE   SAME      AND     GETTING   BENEFIT. 9)         H/O   WORSENING  OF  MIGRAINES    SINCE  June 2021                                 PATIENT     WILL  BE    TRIED   WITH  UBRALEVY                                 PATIENT  REQUESTS  REFILLS    FOR  HIS  MEDICATIONS. 10)           EXPECTATIONS,   GOALS   AND  SIDE  EFFECTS  MEDICATIONS    WERE                                        REVIEWED     AND   DISCUSSED    IN    DETAIL. PATIENT   DENIES  ANY    NEW  NEUROLOGICAL  CONCERNS. PATIENT   REQUESTS   REFILLS  FOR  HIS  MEDICATIONS.                                                      PRECIPITATING  FACTORS: including  fever/infection, exertion/relaxation, position change, stress, weather change,                        medications/alcohol, time of day/darkness/light  Are  present                                                              MODIFYING  FACTORS:  fever/infection, exertion/relaxation, position change, stress, weather change,                        medications/alcohol, time of day/darkness/light  Are  present             Patient   Indicates   The  Presence   And  The  Absence  Of  The  Following    Associated  And       Additional  Neurological    Symptoms:                                Balance  And coordination   problems  absent           Gait problems     absent            Headaches      present              Migraines           present           Memory problemsabsent              Confusion        absent            Paresthesia   numbness          absent           Seizures  And  Starring  Episodes           absent           Syncope,  Near  syncopal episodes         absent           Speech problems           absent             Swallowing   Problems      absent            Dizziness,  Light headedness           present              Vertigo        absent             Generalized   Weakness    absent              focal  Weakness     absent             Tremors         absent              Sleep  Problems     absent             History  Of   Recent  Head  Injury     absent             History  Of   Recent  TIA     absent             History  Of   Recent    Stroke     absent             Neck  Pain   and   Neck muscle  Spasms  absent               Radiating  down   And   Weakness           absent            Lower back   Pain  And     Spasms  absent              Radiating    Down   And   Weakness          absent                H/OFALLS        absent               History  Of   Visual  Symptoms    absent                  Associated   Diplopia       absent                                               Also   Additional   Symptoms   Present    As  Documented    In   The   detailed                  Review  Of  Systems   And    Please   Refer   To    Them for   Additional    Information. Any components  That are either  Unobtainable  Or  Limited  In   HPI, ROS  And/or PFSH   Are                   Due   ToPatient's  Medical  Problems,  Clinical  Condition   and/or lack of                                other    Alternate   resources.           RECORDS   REVIEWED:    historical medical records       INFORMATION   REVIEWED:     MEDICAL   HISTORY,SURGICAL   HISTORY,     MEDICATIONS   LIST,   ALLERGIES AND  DRUG  INTOLERANCES,       FAMILY   HISTORY,  SOCIAL  HISTORY,      PROBLEM  LIST   FOR  PATIENT  CARE   COORDINATION      Past Medical History:   Diagnosis Date    ADHD (attention deficit hyperactivity disorder)     Anxiety     Depression     Epigastric pain     Headache     Weight gain          Past Surgical History:   Procedure Laterality Date    COLONOSCOPY  03/27/2019    [facility]    HARDWARE REMOVAL  01/17/2019    LEG SURGERY Right 10/2017    UPPER GASTROINTESTINAL ENDOSCOPY      [facility]         Current Outpatient Medications   Medication Sig Dispense Refill    topiramate (TOPAMAX) 100 MG tablet 1/2   TABLET   TWICE  DAILY     FOR      TWO    WEEKS,     THEN      ONE  TABLET   TWICE  DAILY 60 tablet 2    SUMAtriptan (IMITREX) 100 MG tablet Take 1 tablet by mouth once as needed for Migraine 12 tablet 2    naproxen (EC-NAPROSYN) 500 MG EC tablet Take 1 tablet by mouth 2 times daily (with meals) AS  NEEDED 60 tablet 1    omeprazole (PRILOSEC) 20 MG delayed release capsule Take 1 capsule by mouth daily 30 capsule 2    ondansetron (ZOFRAN ODT) 4 MG disintegrating tablet Take 1 tablet by mouth every 8 hours as needed for Nausea or Vomiting 30 tablet 1    traZODone (DESYREL) 100 MG tablet ONE  TABLET   AT  BEDTIME 30 tablet 2    Ubrogepant (UBRELVY) 100 MG TABS Take 100 mg by mouth 2 times daily as needed (FOR     SEVERE   MIGRAINES) 30 tablet 1    albuterol sulfate HFA (PROVENTIL HFA) 108 (90 Base) MCG/ACT inhaler Inhale 2 puffs into the lungs      VENTOLIN  (90 Base) MCG/ACT inhaler inhale 2 puffs by mouth every 4 hours if needed  0     No current facility-administered medications for this visit.          Allergies   Allergen Reactions    Dilaudid [Hydromorphone Hcl]     Prozac [Fluoxetine Hcl]     Zoloft [Sertraline Hcl]     Amoxicillin Rash         Family History   Problem Relation Age of Onset    High Blood Pressure Mother     Asthma Mother     COPD Mother     Arthritis Mother         Rhuematoid    Heart Disease Mother     Bipolar Disorder Mother     Heart Attack Maternal Grandmother     Early Death Maternal Grandmother 61    Heart Attack Maternal Grandfather     Early Death Maternal Grandfather 61    Heart Disease Paternal Grandmother     Diabetes Paternal Grandmother     Colon Cancer Maternal Aunt 54    Crohn's Disease Neg Hx     Ulcerative Colitis Neg Hx     Colon Polyps Neg Hx          Social History     Socioeconomic History    Marital status: Single     Spouse name: Not on file    Number of children: Not on file    Years of education: Not on file    Highest education level: Not on file   Occupational History    Not on file   Tobacco Use    Smoking status: Passive Smoke Exposure - Never Smoker    Smokeless tobacco: Never Used    Tobacco comment: Former user- for about 6 months   Vaping Use    Vaping Use: Former    Quit date: 1/1/2019    Substances: Always   Substance and Sexual Activity    Alcohol use: No    Drug use: No    Sexual activity: Not on file   Other Topics Concern    Not on file   Social History Narrative    Not on file     Social Determinants of Health     Financial Resource Strain:     Difficulty of Paying Living Expenses:    Food Insecurity:     Worried About Running Out of Food in the Last Year:     [address] in the Last Year:    Transportation Needs:     Lack of Transportation (Medical):      Lack of Transportation (Non-Medical):    Physical Activity:     Days of Exercise per Week:     Minutes of Exercise per Session:    Stress:     Feeling of Stress :    Social Connections:     Frequency of Communication with Friends and Family:     Frequency of Social Gatherings with Friends and Family:     Attends Sabianist Services:     Active Member of Clubs or Organizations:     Attends Club or Organization Meetings:     Marital Status:    Intimate Partner Violence:     Fear of Current or Ex-Partner:     Emotionally Abused:     Physically Abused:     Sexually Abused:        Vitals:    08/12/21 1443   BP: 138/82   Pulse: 91   SpO2: 99%         Wt Readings from Last 3 Encounters:   08/12/21 179 lb (81.2 kg)   05/12/21 180 lb (81.6 kg)   02/04/21 172 lb 12.8 oz (78.4 kg)         BP Readings from Last 3 Encounters:   08/12/21 138/82   05/12/21 (!) 142/80   02/04/21 114/68             Review of Systems Constitutional: Negative for appetite change, chills, fatigue, fever, unexpected weight change and weight loss. HENT: Negative for congestion, dental problem, drooling, ear discharge, ear pain, facial swelling, hearing loss, mouth sores, nosebleeds, postnasal drip, rhinorrhea, sinus pressure, sore throat, tinnitus, trouble swallowing and voice change. Eyes: Positive for photophobia. Negative for blurred vision, pain, discharge, redness, itching and visual disturbance. Respiratory: Negative for apnea, cough, choking, chest tightness, shortness of breath and wheezing. Cardiovascular: Negative for chest pain, palpitations and leg swelling. Gastrointestinal: Positive for nausea. Negative for abdominal distention, abdominal pain, anorexia, blood in stool, constipation, diarrhea and vomiting. Endocrine: Negative for cold intolerance, heat intolerance, polydipsia, polyphagia and polyuria. Musculoskeletal: Positive for neck pain. Negative for arthralgias, back pain, gait problem, joint swelling, myalgias and neck stiffness. Skin: Negative for color change, pallor, rash and wound. Allergic/Immunologic: Negative for environmental allergies, food allergies and immunocompromised state. Neurological: Positive for dizziness and headaches. Negative for tingling, tremors, seizures, syncope, facial asymmetry, speech difficulty, weakness, light-headedness, numbness and loss of balance. Hematological: Negative for adenopathy. Does not bruise/bleed easily. Psychiatric/Behavioral: Positive for decreased concentration. Negative for agitation, behavioral problems, confusion, dysphoric mood, hallucinations, self-injury, sleep disturbance and suicidal ideas. The patient is nervous/anxious. The patient does not have insomnia and is not hyperactive. OBJECTIVE:    Physical Exam  Constitutional:       Appearance: He is well-developed. HENT:      Head: Normocephalic and atraumatic.  No raccoon eyes or COORDINATION  AND  GAIT :                                Station and  Gait  normal                              Romberg 's test negative                          Ataxia negative      ASSESSMENT:        Patient Active Problem List   Diagnosis    Electronic cigarette use    ADHD (attention deficit hyperactivity disorder)    Anxiety    Depression    Headache    Migraine without aura and without status migrainosus, not intractable    Dizziness    Sleep difficulties    Chronic tension-type headache, intractable    Memory problem       MRI OF THE BRAIN WITHOUT AND WITH CONTRAST  9/11/2020 1:15 pm         TECHNIQUE:    Multiplanar multisequence MRI of the head/brain was performed without and    with the administration of intravenous contrast.         COMPARISON:    None.         HISTORY:    ORDERING SYSTEM PROVIDED HISTORY: Chronic nonintractable headache,    unspecified headache type    TECHNOLOGIST PROVIDED HISTORY:    Reason for Exam:  Chronic migraine headaches, worse recently. Acuity: Chronic    Type of Exam: Unknown    Additional signs and symptoms: Chronic non intractable headache, unspecified    headache type; Migraine without aura and without status migrainosus, not    intractable; Dizziness.         FINDINGS:    Motion degrades images limiting evaluation.         INTRACRANIAL STRUCTURES/VENTRICLES: Lila Cheeks is no acute infarct. No mass    effect or midline shift. No evidence of an acute intracranial hemorrhage.     The ventricles and sulci are normal in size and configuration.  The    sellar/suprasellar regions appear unremarkable.  The normal signal voids    within the major intracranial vessels appear maintained.  No abnormal focus    of enhancement is seen within the brain.         ORBITS: The visualized portion of the orbits demonstrate no acute abnormality.         SINUSES: Mild scattered mucosal thickening of the paranasal sinuses.  The    mastoid air cells demonstrate no acute abnormality.      BONES/SOFT TISSUES: The bone marrow signal intensity appears normal. The soft    tissues demonstrate no acute abnormality.              Impression    1. Patient motion partially limits evaluation. 2. Grossly unremarkable MRI of the brain. 3. Mild scattered mucosal thickening of the paranasal sinuses. VISITING DIAGNOSIS:            ICD-10-CM    1. Migraine without aura and without status migrainosus, not intractable  G43.009    2. Chronic tension-type headache, intractable  G44.221    3. Sleep difficulties  G47.9    4. Anxiety  F41.9    5. Memory problem  R41.3    6. Other vascular headache  G44.1    7. Episodic tension-type headache, not intractable  G44.219    8. Dizziness  R42    9. Attention deficit hyperactivity disorder (ADHD), predominantly inattentive type  F90.0                     CONCERNS   &   INCREASED   RISK   FOR          *   POORLY  CONTROLLED   &  COMPLICATED  MIGRAINES      *    CHRONIC  TENSION  HEADACHES                 VARIOUS  RISK   FACTORS   WERE  REVIEWED   AND   DISCUSSED. PLAN:                         Torin Flores  Of  The  Diagnoses,  The  Management & Treatment  Options            AND    Care  plan  Were          Reviewed and   Discussed   With  patient. * Goals  And  Expectations  Of  The  Therapy  Discussed   And  Reviewed. *   Benefits   And   Side  Effect  Profile  Of  Medication/s   Were   Discussed                * Need   For  Further   Follow up For  The  Various  Problems Were  discussed. * Results  Of  The  Previous  Diagnostic tests were reviewed and  discussed                 Medical  Decision  Making  Was  Made  Based on the   Complexity  Of  Above  Mentioned  Diagnoses,    Data reviewed             And    Risk  Of  Significant   Co morbidities and   complicating   Factors.                  Medical  Decision  Was   High  Complexity   Due   To  The  Patient's  Multiple  Symptoms,       Complex  Treatment  Regimen,  Multiple medications           and   Risk  Of   Side  Effects,  Difficulty  In  Medication  Management  And  Diagnostic  Challenges       In  View  Of  The  Associated   Co  Morbid  Conditions   And  Problems. *   BE  CAREFUL  WITH  ACTIVITIES   INCLUDING  DRIVING. *   ADEQUATE   FLUIDINTAKE   AND  ELECTROLYTE  BALANCE             * KEEP  DAIRY  OF   THE  NEUROLOGICAL  SYMPTOMS        RECORDING THE    DURATION  AND  FREQUENCY. *  AVOID    CONDITIONS  AND  FACTORS   THAT  MAKE                  NEUROLOGICAL  SYMPTOMS  WORSE.                  *TO  MAINTAIN  REGULAR  SLEEP  WAKE  CYCLES. *   TO  HAVE  ADEQUATE  REST  AND   SLEEP    HOURS.            *    AVOID  ANY USAGE OF    TOBACCO,              EXCESSIVE  ALCOHOL  AND   ILLEGAL   SUBSTANCES          *  CONTINUE   MEDICATIONS    PRESCRIBED     AS    RECOMMENDED       *   Compliance   With  Medications   And  Instructions          *    MIGRAINE/ HEADACHE    DAIRY   WITH  MONITORING                       OF  DURATION  AND  FREQUENCY. *    Prophylactic  Use   Of     Vitamin   B   Complex,  Folic  Acid,    Vitamin  B12    Multivitamin,       Calcium  With  magnesium  And  Vit D    Supplementations   Over  The  Counter  Discussed                      *         PATIENT      STARTED    ON       TOPAMAX,  IMITREX                                     TRAZODONE      IN    OCT. 2020     FOR                                 HIS  CHRONIC  HEADACHES  AND    MIGRAINES ,   SLEEP  DIFFICULTIES                                                     PATIENT  TOLERATING   THE   SAME      AND     GETTING   BENEFIT. *            EXPECTATIONS,   GOALS   AND  SIDE  EFFECTS  MEDICATIONS    WERE                                        REVIEWED     AND   DISCUSSED    IN    DETAIL. PATIENT   REQUESTS   REFILLS  FOR  HIS  MEDICATIONS.                                     Orders Placed This Encounter   Medications    topiramate (TOPAMAX) 100 MG tablet     Si/2   TABLET   TWICE  DAILY     FOR      TWO    WEEKS,     THEN      ONE  TABLET   TWICE  DAILY     Dispense:  60 tablet     Refill:  2    SUMAtriptan (IMITREX) 100 MG tablet     Sig: Take 1 tablet by mouth once as needed for Migraine     Dispense:  12 tablet     Refill:  2    naproxen (EC-NAPROSYN) 500 MG EC tablet     Sig: Take 1 tablet by mouth 2 times daily (with meals) AS  NEEDED     Dispense:  60 tablet     Refill:  1    omeprazole (PRILOSEC) 20 MG delayed release capsule     Sig: Take 1 capsule by mouth daily     Dispense:  30 capsule     Refill:  2    ondansetron (ZOFRAN ODT) 4 MG disintegrating tablet     Sig: Take 1 tablet by mouth every 8 hours as needed for Nausea or Vomiting     Dispense:  30 tablet     Refill:  1    traZODone (DESYREL) 100 MG tablet     Sig: ONE  TABLET   AT  BEDTIME     Dispense:  30 tablet     Refill:  2    Ubrogepant (UBRELVY) 100 MG TABS     Sig: Take 100 mg by mouth 2 times daily as needed (FOR     SEVERE   MIGRAINES)     Dispense:  30 tablet     Refill:  1                   *PATIENT   TO  FOLLOW  UP  WITH   PRIMARY  CARE         OTHER  CONSULTANTS  AS  BEFORE.           *TO  FOLLOW  WITH   MENTAL  HEALTH  PROFESSIONALS ,  INCLUDING            PSYCHOLOGICAL  COUNSELING   AND  PSYCHIATRIC  EVALUTIONS,                   *  Maintain   Healthy  Life Style    With   Periodic  Monitoring  Of      Any  Medical  Conditions  Including   Elevated  Blood  Pressure,  Lipid  Profile,     Blood  Sugar levels  AndHeart  Disease. *   Period   Screening  For  Cancers  Involving  Breast,  Colon,    lungs  And  Other  Organs  As  Applicable,  In consultation   With  Your  Primary Care Providers. *Second  Neurological  Opinion  And  Evaluations  In  [facility]  Setting  If  Patient  Is  Interested.             * Please   Contact   Neurology  Clinic   Early   If   Are  Any  New Neurological   And  Any neurological  Concerns. *  If  The  Patient remains  Neurologically  Stable   Return   To  [facility] Neurology Department   IN    2-  3      MONTHS  TIME   FOR  FURTHER              FOLLOW UP.                       *   The  Neurological   Findings,  Possible  Diagnosis,  Differential diagnoses                    And  Options  For    Further   Investigations                   And  management   Are  Discussed  Comprehensively. Medications   And  Prescription   Risks  And  Side effects  Are   Also  Discussed. *  If   There is  Any  Significant  Worsening   Of  Current  Symptoms  And  Or                  If patient  Develops   Any additional  New  NeurologicalSymptoms                  Or  Significant  Concerns   Should  Call  911 or  Go  To  Emergency  Department  For  Further  Immediate  Evaluation. The   Above  Were  Reviewed  With  patient   and                       questions  Answered  In  Detail. More   Than  50% of face  To face Time   Was  Spent  On  Counseling                    And   Coordination  Of  Care   Of   Patient's  multiple   Neurological  Problems                         And   Comorbid  Medical   Conditions. Electronically signed by Claire Guevara MD.,  [facility]      Board Certified in  Neurology &  In  [facility] [employer] [address] [employer] and [employer] ([employer])      DISCLAIMER:   Although every effort was made to ensure the accuracy of this  electronictranscription, some errors in transcription may have occurred. GENERAL PATIENT INSTRUCTIONS:      A Healthy Lifestyle: Care Instructions   Your Care Instructions   A healthy lifestyle can help you feel good, stay at ahealthy weight, and have plenty of energy for both work and play. A healthy lifestyle is something you can share with your whole family.    A healthy lifestyle also can lower your risk for serious health problems, such ashigh blood pressure, heart disease, and diabetes.  You can follow a few steps listed below to improve your health and the health of your family.  Follow-up careis a key part of your treatment and safety. Be sure to make and go to all appointments, and call your doctor if you are having problems. Its also a good idea to know your test results and keep a list of the medicines you take.  How can you care for yourself at home?  Do not eat too much sugar, fat, or fast foods. You can still have dessert and treats nowand then. The goal is moderation.  Start small to improve your eating habits. Pay attention to portion sizes, drink less juice and soda pop, and eat more fruits and vegetables.  Eat a healthy amount of food. A 3-ounce serving of meat, for example, is about the size of a deck of cards. Fill the rest of your plate with vegetables and whole grains.  Limit theamount of soda and sports drinks you have every day. Drink more water when you are thirsty.  Eat at least 5 servings of fruits and vegetables every day. It may seem like a lot, but it is not hard to reach this goal. Aserving or helping is 1 piece of fruit, 1 cup of vegetables, or 2 cups of leafy, raw vegetables. Have an apple or some carrot sticks as an afternoon snack instead of a candy bar. Try to have fruits and/or vegetables at everymeal.   Make exercise part of your daily routine. You may want to start with simple activities, such as walking, bicycling, or slow swimming. Try carlos active 30 to 60 minutes every day. You do not need to do all 30 to 60 minutes all at once. For example, you can exercise 3 times a day for 10 or 20 minutes. Moderate exercise is safe for most people, but it is always agood idea to talk to your doctor before starting an exercise program.   Keep moving. Pardeep Main the lawn, work in the garden, or [employer].  Take the stairs instead of the elevator at work.  If you smoke, quit. Peoplewho smoke have an increased risk for heart attack, stroke, cancer, and other lung illnesses. Quitting is hard, but there are ways to boost your chance of quitting tobacco for good.  Use nicotine gum, patches, or lozenges.  Ask your doctor about stop-smoking programs and medicines.  Keep trying.  In addition to reducing your risk of diseases in the future, you will notice some benefits soon after you stop using tobacco. If you have shortness of breath or asthma symptoms, they will likely getbetter within a few weeks after you quit.  Limit how much alcohol you drink. Moderate amounts of alcohol (up to 2 drinks a day for men, 1drink a day for women) are okay. But drinking too much can lead to liver problems, high blood pressure, and other health problems.  health   If you have a family, there are many things you can do together to improve your health.  Eat meals together as a family as often as possible.  Eat healthy foods. This includes fruits, vegetables, lean meats and dairy, and whole grains.  Include your family in your fitness plan. Most peoplethink of activities such as jogging or tennis as the way to fitness, but there are many ways you and your family can be more active. Anything that makes you breathe hard and gets your heart pumping is exercise. Here are sometips:   Walk to do errands or to take your child to school or the bus.  Go for a family bike ride after dinner instead of watching TV.  Where can you learn more?  Go [email or web address] and sign in to your [employer] account. Enter O352 in the Search HealthInformation box to learn more about \"A Healthy Lifestyle: Care Instructions. \"     If you do not have anaccount, please click on the \"Sign Up Now\" link.  Current as of: July 26, 2016   Content Version: 11.2   © 2383-9024 [employer]. Care instructions adapted under license by [facility] [place] [facility]  If you

## 2021-10-21 ENCOUNTER — OFFICE VISIT (OUTPATIENT)
Dept: NEUROLOGY | Age: 21
End: 2021-10-21
Payer: COMMERCIAL

## 2021-10-21 VITALS
HEART RATE: 84 BPM | OXYGEN SATURATION: 98 % | DIASTOLIC BLOOD PRESSURE: 84 MMHG | SYSTOLIC BLOOD PRESSURE: 122 MMHG | BODY MASS INDEX: 25.2 KG/M2 | WEIGHT: 176 LBS | HEIGHT: 70 IN

## 2021-10-21 DIAGNOSIS — F90.0 ATTENTION DEFICIT HYPERACTIVITY DISORDER (ADHD), PREDOMINANTLY INATTENTIVE TYPE: ICD-10-CM

## 2021-10-21 DIAGNOSIS — G47.9 SLEEP DIFFICULTIES: ICD-10-CM

## 2021-10-21 DIAGNOSIS — G44.219 EPISODIC TENSION-TYPE HEADACHE, NOT INTRACTABLE: ICD-10-CM

## 2021-10-21 DIAGNOSIS — R41.3 MEMORY PROBLEM: ICD-10-CM

## 2021-10-21 DIAGNOSIS — F41.9 ANXIETY: ICD-10-CM

## 2021-10-21 DIAGNOSIS — G44.1 OTHER VASCULAR HEADACHE: ICD-10-CM

## 2021-10-21 DIAGNOSIS — R42 DIZZINESS: ICD-10-CM

## 2021-10-21 DIAGNOSIS — G43.009 MIGRAINE WITHOUT AURA AND WITHOUT STATUS MIGRAINOSUS, NOT INTRACTABLE: Primary | ICD-10-CM

## 2021-10-21 PROCEDURE — G8419 CALC BMI OUT NRM PARAM NOF/U: HCPCS | Performed by: PSYCHIATRY & NEUROLOGY

## 2021-10-21 PROCEDURE — 99214 OFFICE O/P EST MOD 30 MIN: CPT | Performed by: PSYCHIATRY & NEUROLOGY

## 2021-10-21 PROCEDURE — 1036F TOBACCO NON-USER: CPT | Performed by: PSYCHIATRY & NEUROLOGY

## 2021-10-21 PROCEDURE — G8484 FLU IMMUNIZE NO ADMIN: HCPCS | Performed by: PSYCHIATRY & NEUROLOGY

## 2021-10-21 PROCEDURE — G8427 DOCREV CUR MEDS BY ELIG CLIN: HCPCS | Performed by: PSYCHIATRY & NEUROLOGY

## 2021-10-21 PROCEDURE — 99213 OFFICE O/P EST LOW 20 MIN: CPT | Performed by: PSYCHIATRY & NEUROLOGY

## 2021-10-21 RX ORDER — OMEPRAZOLE 20 MG/1
20 CAPSULE, DELAYED RELEASE ORAL DAILY
Qty: 30 CAPSULE | Refills: 2 | Status: SHIPPED | OUTPATIENT
Start: 2021-10-21 | End: 2022-01-27 | Stop reason: SDUPTHER

## 2021-10-21 RX ORDER — NAPROXEN 500 MG/1
500 TABLET ORAL 2 TIMES DAILY WITH MEALS
Qty: 60 TABLET | Refills: 1 | Status: SHIPPED | OUTPATIENT
Start: 2021-10-21 | End: 2022-01-27 | Stop reason: SDUPTHER

## 2021-10-21 RX ORDER — TOPIRAMATE 100 MG/1
TABLET, FILM COATED ORAL
Qty: 60 TABLET | Refills: 2 | Status: SHIPPED | OUTPATIENT
Start: 2021-10-21 | End: 2022-01-27 | Stop reason: SDUPTHER

## 2021-10-21 RX ORDER — ONDANSETRON 4 MG/1
4 TABLET, ORALLY DISINTEGRATING ORAL EVERY 8 HOURS PRN
Qty: 30 TABLET | Refills: 1 | Status: SHIPPED | OUTPATIENT
Start: 2021-10-21 | End: 2022-01-27 | Stop reason: SDUPTHER

## 2021-10-21 RX ORDER — SUMATRIPTAN 100 MG/1
100 TABLET, FILM COATED ORAL
Qty: 12 TABLET | Refills: 2 | Status: SHIPPED | OUTPATIENT
Start: 2021-10-21 | End: 2022-01-27 | Stop reason: SDUPTHER

## 2021-10-21 RX ORDER — TRAZODONE HYDROCHLORIDE 100 MG/1
TABLET ORAL
Qty: 30 TABLET | Refills: 2 | Status: SHIPPED | OUTPATIENT
Start: 2021-10-21 | End: 2022-01-27 | Stop reason: SDUPTHER

## 2021-10-21 ASSESSMENT — ENCOUNTER SYMPTOMS
EYE WATERING: 0
COUGH: 0
ABDOMINAL DISTENTION: 0
VOMITING: 0
DIARRHEA: 0
EYE REDNESS: 0
VISUAL CHANGE: 1
SORE THROAT: 0
BLURRED VISION: 0
SINUS PRESSURE: 0
CONSTIPATION: 0
SWOLLEN GLANDS: 0
EYE ITCHING: 0
EYE DISCHARGE: 0
SHORTNESS OF BREATH: 0
FACIAL SWEATING: 0
SCALP TENDERNESS: 0
TROUBLE SWALLOWING: 0
COLOR CHANGE: 0
PHOTOPHOBIA: 1
CHEST TIGHTNESS: 0
APNEA: 0
RHINORRHEA: 0
CHOKING: 0
BLOOD IN STOOL: 0
NAUSEA: 1
ABDOMINAL PAIN: 0
EYE PAIN: 0
FACIAL SWELLING: 0
WHEEZING: 0
BACK PAIN: 0
VOICE CHANGE: 0

## 2021-10-21 NOTE — PROGRESS NOTES
[facility]  Neurology  [address]TA:[phone]   Fax: [phone]    SUBJECTIVE:     PATIENT ID:  Alice Brantley is a  RIGHT    HANDED 24 y.o. male. Migraine   This is a chronic problem. Episode onset: SINCE    AGE OF   6  YEARS. The problem occurs intermittently. The problem has been waxing and waning. The pain is located in the left unilateral region. The pain does not radiate. The pain quality is not similar to prior headaches. The quality of the pain is described as aching, stabbing, pulsating and throbbing. The pain is at a severity of 3/10. The pain is mild. Associated symptoms include dizziness, nausea, neck pain, phonophobia, photophobia and a visual change. Pertinent negatives include no abdominal pain, abnormal behavior, anorexia, back pain, blurred vision, coughing, drainage, ear pain, eye pain, eye redness, eye watering, facial sweating, fever, hearing loss, insomnia, loss of balance, muscle aches, numbness, rhinorrhea, scalp tenderness, seizures, sinus pressure, sore throat, swollen glands, tingling, tinnitus, vomiting, weakness or weight loss. The symptoms are aggravated by bright light, emotional stress and noise. Treatments tried: TOPAMAX. The treatment provided significant relief. His past medical history is significant for migraine headaches and recent head traumas. There is no history of cancer, cluster headaches, hypertension, immunosuppression, migraines in the family, obesity, pseudotumor cerebri, sinus disease or TMJ. History obtained from  The patient     and other  available   medical  records   were  Also  reviewed. The  Duration,  Quality,  Severity,  Location,  Timing,  Context,  Modifying  Factors   Of   The   Chief   Complaint       And  Present  Illness  Was   Reviewed   In   Chronological   Manner.                                                 PATIENT'S  MAIN  CONCERNS INCLUDE : HIS  CHRONIC  HEADACHES  AND    MIGRAINES ,                                         SLEEP  DIFFICULTIES                                                     PATIENT  TOLERATING   THE   SAME      AND     GETTING   BENEFIT. 9)         UBRALEVY   TABLETS    NOT  APPROVED   BY  HIS INSURANCE                  10)       PROLONGED   MIGRAINE   WITH  NAUSEA    AND   VOMITING                                 IN      SEPT. 2021                                 PATIENT    WAS    SEEN   ER       AND   TREATED    WITH                                 INJECTION    TORADOL                          11)       PATIENT'S    MIGRAINES  AND  HEADACHES      ARE   FAIRLY                                WELL  CONTROLLED        WITH  CURRENT  MEDICATIONS. PATIENT   DENIES  ANY    NEW  NEUROLOGICAL  CONCERNS. 12)           EXPECTATIONS,   GOALS   OF    HEADACHE    &   MIGRAINE     MANAGEMENT                                   AND  SIDE  EFFECTS  MEDICATIONS    WERE                                        REVIEWED     AND   DISCUSSED    IN    DETAIL. PATIENT   REQUESTS   REFILLS  FOR  HIS  MEDICATIONS.                                                      PRECIPITATING  FACTORS: including  fever/infection, exertion/relaxation, position change, stress, weather change,                        medications/alcohol, time of day/darkness/light  Are  present                                                              MODIFYING  FACTORS:  fever/infection, exertion/relaxation, position change, stress, weather change,                        medications/alcohol, time of day/darkness/light  Are  present             Patient   Indicates   The  Presence   And  The  Absence  Of  The  Following    Associated  And       Additional  Neurological    Symptoms: Balance  And coordination   problems  absent           Gait problems     absent            Headaches      present              Migraines           present           Memory problemsabsent              Confusion        absent            Paresthesia   numbness          absent           Seizures  And  Starring  Episodes           absent           Syncope,  Near  syncopal episodes         absent           Speech   problems           absent             Swallowing   Problems      absent            Dizziness,  Light headedness           present              Vertigo        absent             Generalized   Weakness    absent              focal  Weakness     absent             Tremors         absent              Sleep  Problems     absent             History  Of   Recent  Head  Injury     absent             History  Of   Recent  TIA     absent             History  Of   Recent    Stroke     absent             Neck  Pain   and   Neck muscle  Spasms  absent               Radiating  down   And   Weakness           absent            Lower back   Pain  And     Spasms  absent              Radiating    Down   And   Weakness          absent                H/OFALLS        absent               History  Of   Visual  Symptoms    absent                  Associated   Diplopia       absent                                               Also   Additional   Symptoms   Present    As  Documented    In   The   detailed                  Review  Of  Systems   And    Please   Refer   To    Them for   Additional    Information. Any components  That are either  Unobtainable  Or  Limited  In   HPI, ROS  And/or PFSH   Are                   Due   ToPatient's  Medical  Problems,  Clinical  Condition   and/or lack of                                other    Alternate   resources.           RECORDS   REVIEWED:    historical medical records       INFORMATION   REVIEWED:     MEDICAL   HISTORY,SURGICAL   HISTORY, MEDICATIONS   LIST,   ALLERGIES AND  DRUG  INTOLERANCES,       FAMILY   HISTORY,  SOCIAL  HISTORY,      PROBLEM  LIST   FOR  PATIENT  CARE   COORDINATION      Past Medical History:   Diagnosis Date    ADHD (attention deficit hyperactivity disorder)     Anxiety     Depression     Epigastric pain     Headache     Weight gain          Past Surgical History:   Procedure Laterality Date    [facility]    HARDWARE REMOVAL  01/17/2019    LEG SURGERY Right 10/2017    UPPER GASTROINTESTINAL ENDOSCOPY      [facility]         Current Outpatient Medications   Medication Sig Dispense Refill    topiramate (TOPAMAX) 100 MG tablet ONE  TABLET   TWICE  DAILY 60 tablet 2    SUMAtriptan (IMITREX) 100 MG tablet Take 1 tablet by mouth once as needed for Migraine 12 tablet 2    naproxen (EC-NAPROSYN) 500 MG EC tablet Take 1 tablet by mouth 2 times daily (with meals) AS  NEEDED 60 tablet 1    omeprazole (PRILOSEC) 20 MG delayed release capsule Take 1 capsule by mouth daily 30 capsule 2    ondansetron (ZOFRAN ODT) 4 MG disintegrating tablet Take 1 tablet by mouth every 8 hours as needed for Nausea or Vomiting 30 tablet 1    traZODone (DESYREL) 100 MG tablet ONE  TABLET   AT  BEDTIME 30 tablet 2    albuterol sulfate HFA (PROVENTIL HFA) 108 (90 Base) MCG/ACT inhaler Inhale 2 puffs into the lungs      VENTOLIN  (90 Base) MCG/ACT inhaler inhale 2 puffs by mouth every 4 hours if needed  0    Ubrogepant (UBRELVY) 100 MG TABS Take 100 mg by mouth 2 times daily as needed (FOR     SEVERE   MIGRAINES) (Patient not taking: Reported on 10/21/2021) 30 tablet 1     No current facility-administered medications for this visit.          Allergies   Allergen Reactions    Dilaudid [Hydromorphone Hcl]     Prozac [Fluoxetine Hcl]     Zoloft [Sertraline Hcl]     Amoxicillin Rash         Family History   Problem Relation Age of Onset    High Blood Pressure Mother     Asthma Mother     COPD Mother     Arthritis Mother         Rhuematoid    Heart Disease Mother     Bipolar Disorder Mother     Heart Attack Maternal Grandmother     Early Death Maternal Grandmother 61    Heart Attack Maternal Grandfather     Early Death Maternal Grandfather 61    Heart Disease Paternal Grandmother     Diabetes Paternal Grandmother     Colon Cancer Maternal Aunt 54    Crohn's Disease Neg Hx     Ulcerative Colitis Neg Hx     Colon Polyps Neg Hx          Social History     Socioeconomic History    Marital status: Single     Spouse name: Not on file    Number of children: Not on file    Years of education: Not on file    Highest education level: Not on file   Occupational History    Not on file   Tobacco Use    Smoking status: Passive Smoke Exposure - Never Smoker    Smokeless tobacco: Never Used    Tobacco comment: Former user- for about 6 months   Vaping Use    Vaping Use: Former    Quit date: 1/1/2019    Substances: Always   Substance and Sexual Activity    Alcohol use: No    Drug use: No    Sexual activity: Not on file   Other Topics Concern    Not on file   Social History Narrative    Not on file     Social Determinants of Health     Financial Resource Strain:     Difficulty of Paying Living Expenses:    Food Insecurity:     Worried About Running Out of Food in the Last Year:     [address] in the Last Year:    Transportation Needs:     Lack of Transportation (Medical):      Lack of Transportation (Non-Medical):    Physical Activity:     Days of Exercise per Week:     Minutes of Exercise per Session:    Stress:     Feeling of Stress :    Social Connections:     Frequency of Communication with Friends and Family:     Frequency of Social Gatherings with Friends and Family:     Attends Congregation Services:     Active Member of Clubs or Organizations:     Attends Club or Organization Meetings:     Marital Status:    Intimate Partner Violence:     Fear of Current or Ex-Partner:  Emotionally Abused:     Physically Abused:     Sexually Abused:        Vitals:    10/21/21 1326   BP: 122/84   Pulse: 84   SpO2: 98%         Wt Readings from Last 3 Encounters:   10/21/21 176 lb (79.8 kg)   08/12/21 179 lb (81.2 kg)   05/12/21 180 lb (81.6 kg)         BP Readings from Last 3 Encounters:   10/21/21 122/84   08/12/21 138/82   05/12/21 (!) 142/80             Review of Systems   Constitutional: Negative for appetite change, chills, fatigue, fever, unexpected weight change and weight loss. HENT: Negative for congestion, dental problem, drooling, ear discharge, ear pain, facial swelling, hearing loss, mouth sores, nosebleeds, postnasal drip, rhinorrhea, sinus pressure, sore throat, tinnitus, trouble swallowing and voice change. Eyes: Positive for photophobia. Negative for blurred vision, pain, discharge, redness, itching and visual disturbance. Respiratory: Negative for apnea, cough, choking, chest tightness, shortness of breath and wheezing. Cardiovascular: Negative for chest pain, palpitations and leg swelling. Gastrointestinal: Positive for nausea. Negative for abdominal distention, abdominal pain, anorexia, blood in stool, constipation, diarrhea and vomiting. Endocrine: Negative for cold intolerance, heat intolerance, polydipsia, polyphagia and polyuria. Musculoskeletal: Positive for neck pain. Negative for arthralgias, back pain, gait problem, joint swelling, myalgias and neck stiffness. Skin: Negative for color change, pallor, rash and wound. Allergic/Immunologic: Negative for environmental allergies, food allergies and immunocompromised state. Neurological: Positive for dizziness and headaches. Negative for tingling, tremors, seizures, syncope, facial asymmetry, speech difficulty, weakness, light-headedness, numbness and loss of balance. Hematological: Negative for adenopathy. Does not bruise/bleed easily. Psychiatric/Behavioral: Positive for decreased concentration. Negative for agitation, behavioral problems, confusion, dysphoric mood, hallucinations, self-injury, sleep disturbance and suicidal ideas. The patient is nervous/anxious. The patient does not have insomnia and is not hyperactive. OBJECTIVE:    Physical Exam  Constitutional:       Appearance: He is well-developed. HENT:      Head: Normocephalic and atraumatic. No raccoon eyes or Gordon's sign. Right Ear: External ear normal.      Left Ear: External ear normal.      Nose: Nose normal.   Eyes:      Conjunctiva/sclera: Conjunctivae normal.      Pupils: Pupils are equal, round, and reactive to light. Neck:      Thyroid: No thyroid mass or thyromegaly. Vascular: No carotid bruit. Trachea: No tracheal deviation. Meningeal: Brudzinski's sign and Kernig's sign absent. Cardiovascular:      Rate and Rhythm: Normal rate and regular rhythm. Pulmonary:      Effort: Pulmonary effort is normal.   Musculoskeletal:         General: No tenderness. Normal range of motion. Cervical back: Normal range of motion and neck supple. No rigidity. No muscular tenderness. Normal range of motion. Skin:     General: Skin is warm. Coloration: Skin is not pale. Findings: No erythema or rash. Nails: There is no clubbing. Psychiatric:         Attention and Perception: He is attentive. Mood and Affect: Mood is anxious. Mood is not depressed. Affect is not labile, blunt or inappropriate. Speech: He is communicative. Speech is not rapid and pressured, delayed, slurred or tangential.         Behavior: Behavior is not agitated, slowed, aggressive, withdrawn, hyperactive or combative. Behavior is cooperative. Thought Content: Thought content is not paranoid or delusional. Thought content does not include homicidal or suicidal ideation. Thought content does not include homicidal or suicidal plan. Cognition and Memory: Memory is not impaired.  He does not exhibit impaired recent memory or impaired remote memory. Judgment: Judgment is not impulsive or inappropriate. NEUROLOGICALEXAMINATION :       A) MENTAL STATUS:                   Alert and  oriented  To time, place  And  Person. No Aphasia. No  Dysarthria. Able   To  Follow   THREE    Stepcommands   without   Any  Difficulty. No right  To left confusion. Normal  Speech  And language function. Insight and  Judgment ,Fund  Of  Knowledge   within normal limits                Recent  And  Remote memory  within   normal limits                Attention &  Concentration are within   normal limits                                                 B) CRANIAL NERVES :      CN : Visual  Acuity  And  Visual fields  within normal limits               Fundi  Could  Not  Be  Could  Not  Be  Evaluated. 3,4,6 CN : Both  Pupils are   Reactive and  Equal.  Movements  Are  Intact. No  Nystagmus. No  MANUEL. No  Afferent  Pupillary  Defect noted. 5 CN :  Normal  Facial sensations and Corneal  Reflexes           7 CN:  Normal  Facial  Symmetry  And  Strength. No facial  Weakness. 8 CN :  Hearing  Appears within normal limits          9, 10 CN: Normal   spontaneous, reflex   palate   movements         11 CN:   Normal  Shoulder  shrug and  strength         12 CN :   Normal  Tongue movements and  Tongue  In midline                        No tongue   Fasciculations or atrophy       C) MOTOR  EXAM:                 Strength  In upper  And  Lower   extremities   within   normal limits               No  Drift. No  Atrophy               Rapid   alternating  And  repetitions  Movements  within   normal limits               Muscle  Tone  In upper  And  Lower  Extremities  normal                No rigidity. No  Spasticity. Bradykinesia   absent               No  Asterixis. Sustention  Tremor , Resting   Tremor   absent                    No   other  Abnormal  Movements noted           D) SENSORY :               Light   touch, pinprick,   position  And  Vibration  within normal limits        E) REFLEXES:                   Deep  Tendon  Reflexes   normal                  No  pathological  Reflexes  Bilaterally. F) COORDINATION  AND  GAIT :                                Station and  Gait  normal                              Romberg 's test negative                          Ataxia negative      ASSESSMENT:        Patient Active Problem List   Diagnosis    Electronic cigarette use    ADHD (attention deficit hyperactivity disorder)    Anxiety    Depression    Headache    Migraine without aura and without status migrainosus, not intractable    Dizziness    Sleep difficulties    Chronic tension-type headache, intractable    Memory problem    Episodic tension-type headache, not intractable       MRI OF THE BRAIN WITHOUT AND WITH CONTRAST  9/11/2020 1:15 pm         TECHNIQUE:    Multiplanar multisequence MRI of the head/brain was performed without and    with the administration of intravenous contrast.         COMPARISON:    None.         HISTORY:    ORDERING SYSTEM PROVIDED HISTORY: Chronic nonintractable headache,    unspecified headache type    TECHNOLOGIST PROVIDED HISTORY:    Reason for Exam:  Chronic migraine headaches, worse recently. Acuity: Chronic    Type of Exam: Unknown    Additional signs and symptoms: Chronic non intractable headache, unspecified    headache type; Migraine without aura and without status migrainosus, not    intractable; Dizziness.         FINDINGS:    Motion degrades images limiting evaluation.         INTRACRANIAL STRUCTURES/VENTRICLES: Scare Flies is no acute infarct. No mass    effect or midline shift. No evidence of an acute intracranial hemorrhage.     The ventricles and sulci are normal in size and configuration.  The    sellar/suprasellar regions appear unremarkable.  The normal signal voids    within the major intracranial vessels appear maintained.  No abnormal focus    of enhancement is seen within the brain.         ORBITS: The visualized portion of the orbits demonstrate no acute abnormality.         SINUSES: Mild scattered mucosal thickening of the paranasal sinuses.  The    mastoid air cells demonstrate no acute abnormality.         BONES/SOFT TISSUES: The bone marrow signal intensity appears normal. The soft    tissues demonstrate no acute abnormality.              Impression    1. Patient motion partially limits evaluation. 2. Grossly unremarkable MRI of the brain. 3. Mild scattered mucosal thickening of the paranasal sinuses. VISITING DIAGNOSIS:            ICD-10-CM    1. Migraine without aura and without status migrainosus, not intractable  G43.009    2. Memory problem  R41.3    3. Dizziness  R42    4. Other vascular headache  G44.1    5. Attention deficit hyperactivity disorder (ADHD), predominantly inattentive type  F90.0    6. Anxiety  F41.9    7. Sleep difficulties  G47.9    8. Episodic tension-type headache, not intractable  G44.219                     CONCERNS   &   INCREASED   RISK   FOR          *   POORLY  CONTROLLED   &  COMPLICATED  MIGRAINES      *    CHRONIC  TENSION  HEADACHES                 VARIOUS  RISK   FACTORS   WERE  REVIEWED   AND   DISCUSSED. PLAN:                         Gabbi Ney  Of  The  Diagnoses,  The  Management & Treatment  Options            AND    Care  plan  Were          Reviewed and   Discussed   With  patient. * Goals  And  Expectations  Of  The  Therapy  Discussed   And  Reviewed. *   Benefits   And   Side  Effect  Profile  Of  Medication/s   Were   Discussed                * Need   For  Further   Follow up For  The  Various  Problems Were  discussed.           * Results  Of  The  Previous  Diagnostic tests were reviewed and  discussed                 Medical  Decision  Making  Was  Made  Based on the   Complexity  Of  Above  Mentioned  Diagnoses,    Data reviewed             And    Risk  Of  Significant   Co morbidities and   complicating   Factors. Medical  Decision  Was   High  Complexity   Due   To  The  Patient's  Multiple  Symptoms,       Complex  Treatment  Regimen,  Multiple medications           and   Risk  Of   Side  Effects,  Difficulty  In  Medication  Management  And  Diagnostic  Challenges       In  View  Of  The  Associated   Co  Morbid  Conditions   And  Problems. *   BE  CAREFUL  WITH  ACTIVITIES   INCLUDING  DRIVING. *   ADEQUATE   FLUIDINTAKE   AND  ELECTROLYTE  BALANCE             * KEEP  DAIRY  OF   THE  NEUROLOGICAL  SYMPTOMS        RECORDING THE    DURATION  AND  FREQUENCY. *  AVOID    CONDITIONS  AND  FACTORS   THAT  MAKE                  NEUROLOGICAL  SYMPTOMS  WORSE.                  *TO  MAINTAIN  REGULAR  SLEEP  WAKE  CYCLES. *   TO  HAVE  ADEQUATE  REST  AND   SLEEP    HOURS.            *    AVOID  ANY USAGE OF    TOBACCO,              EXCESSIVE  ALCOHOL  AND   ILLEGAL   SUBSTANCES          *  CONTINUE   MEDICATIONS    PRESCRIBED     AS    RECOMMENDED       *   Compliance   With  Medications   And  Instructions          *    MIGRAINE/ HEADACHE    DAIRY   WITH  MONITORING                       OF  DURATION  AND  FREQUENCY. *    Prophylactic  Use   Of     Vitamin   B   Complex,  Folic  Acid,    Vitamin  B12    Multivitamin,       Calcium  With  magnesium  And  Vit D    Supplementations   Over  The  Counter  Discussed                      *         PATIENT      STARTED    ON       TOPAMAX,  IMITREX                                     TRAZODONE      IN    OCT.    2020     FOR                                 HIS  CHRONIC  HEADACHES  AND    MIGRAINES ,   SLEEP  DIFFICULTIES                                                     PATIENT TOLERATING   THE   SAME      AND     GETTING   BENEFIT. *            EXPECTATIONS,   GOALS   AND  SIDE  EFFECTS  MEDICATIONS    WERE                                        REVIEWED     AND   DISCUSSED    IN    DETAIL. PATIENT   REQUESTS   REFILLS  FOR  HIS  MEDICATIONS. Orders Placed This Encounter   Medications    topiramate (TOPAMAX) 100 MG tablet     Sig: ONE  TABLET   TWICE  DAILY     Dispense:  60 tablet     Refill:  2    SUMAtriptan (IMITREX) 100 MG tablet     Sig: Take 1 tablet by mouth once as needed for Migraine     Dispense:  12 tablet     Refill:  2    naproxen (EC-NAPROSYN) 500 MG EC tablet     Sig: Take 1 tablet by mouth 2 times daily (with meals) AS  NEEDED     Dispense:  60 tablet     Refill:  1    omeprazole (PRILOSEC) 20 MG delayed release capsule     Sig: Take 1 capsule by mouth daily     Dispense:  30 capsule     Refill:  2    ondansetron (ZOFRAN ODT) 4 MG disintegrating tablet     Sig: Take 1 tablet by mouth every 8 hours as needed for Nausea or Vomiting     Dispense:  30 tablet     Refill:  1    traZODone (DESYREL) 100 MG tablet     Sig: ONE  TABLET   AT  BEDTIME     Dispense:  30 tablet     Refill:  2                   *PATIENT   TO  FOLLOW  UP  WITH   PRIMARY  CARE         OTHER  CONSULTANTS  AS  BEFORE.           *TO  FOLLOW  WITH   MENTAL  HEALTH  PROFESSIONALS ,  INCLUDING            PSYCHOLOGICAL  COUNSELING   AND  PSYCHIATRIC  EVALUTIONS,                   *  Maintain   Healthy  Life Style    With   Periodic  Monitoring  Of      Any  Medical  Conditions  Including   Elevated  Blood  Pressure,  Lipid  Profile,     Blood  Sugar levels  AndHeart  Disease. *   Period   Screening  For  Cancers  Involving  Breast,  Colon,    lungs  And  Other  Organs  As  Applicable,  In consultation   With  Your  Primary Care Providers.               *Second  Neurological  Opinion  And Evaluations  In  [facility]  Setting  If  Patient  Is  Interested. * Please   Contact   Neurology  Clinic   Early   If   Are  Any  New  Neurological   And  Any neurological  Concerns. *  If  The  Patient remains  Neurologically  Stable   Return   To  [facility] Neurology Department   IN     3      MONTHS  TIME   FOR  FURTHER              FOLLOW UP.                       *   The  Neurological   Findings,  Possible  Diagnosis,  Differential diagnoses                    And  Options  For    Further   Investigations                   And  management   Are  Discussed  Comprehensively. Medications   And  Prescription   Risks  And  Side effects  Are   Also  Discussed. *  If   There is  Any  Significant  Worsening   Of  Current  Symptoms  And  Or                  If patient  Develops   Any additional  New  NeurologicalSymptoms                  Or  Significant  Concerns   Should  Call  911 or  Go  To  Emergency  Department                  For  Further  Immediate  Evaluation. The   Above  Were  Reviewed  With  patient   and                       questions  Answered  In  Detail. More   Than  50% of face  To face Time   Was  Spent  On  Counseling                    And   Coordination  Of  Care   Of   Patient's  multiple   Neurological  Problems                         And   Comorbid  Medical   Conditions. Electronically signed by Hay Salas MD.,  [employer] [facility]      Board Certified in  Neurology &  In  [employer] ([employer])      DISCLAIMER:   Although every effort was made to ensure the accuracy of this  electronictranscription, some errors in transcription may have occurred.       GENERAL PATIENT INSTRUCTIONS:      A Healthy Lifestyle: Care Instructions   Your Care Instructions   A healthy lifestyle can help you feel good, stay at ahealthy weight, and have plenty of energy for both work and play. A healthy lifestyle is something you can share with your whole family.  A healthy lifestyle also can lower your risk for serious health problems, such ashigh blood pressure, heart disease, and diabetes.  You can follow a few steps listed below to improve your health and the health of your family.  Follow-up careis a key part of your treatment and safety. Be sure to make and go to all appointments, and call your doctor if you are having problems. Its also a good idea to know your test results and keep a list of the medicines you take.  How can you care for yourself at home?  Do not eat too much sugar, fat, or fast foods. You can still have dessert and treats nowand then. The goal is moderation.  Start small to improve your eating habits. Pay attention to portion sizes, drink less juice and soda pop, and eat more fruits and vegetables.  Eat a healthy amount of food. A 3-ounce serving of meat, for example, is about the size of a deck of cards. Fill the rest of your plate with vegetables and whole grains.  Limit theamount of soda and sports drinks you have every day. Drink more water when you are thirsty.  Eat at least 5 servings of fruits and vegetables every day. It may seem like a lot, but it is not hard to reach this goal. Aserving or helping is 1 piece of fruit, 1 cup of vegetables, or 2 cups of leafy, raw vegetables. Have an apple or some carrot sticks as an afternoon snack instead of a candy bar. Try to have fruits and/or vegetables at everymeal.   Make exercise part of your daily routine. You may want to start with simple activities, such as walking, bicycling, or slow swimming. Try carlos active 30 to 60 minutes every day. You do not need to do all 30 to 60 minutes all at once. For example, you can exercise 3 times a day for 10 or 20 minutes.  Moderate exercise is safe for most people, but it is always agood idea to talk to your doctor before starting an exercise program.   Keep moving. Massiel Faust the lawn, work in the garden, or Playroom. Take the stairs instead of the elevator at work.  If you smoke, quit. Peoplewho smoke have an increased risk for heart attack, stroke, cancer, and other lung illnesses. Quitting is hard, but there are ways to boost your chance of quitting tobacco for good.  Use nicotine gum, patches, or lozenges.  Ask your doctor about stop-smoking programs and medicines.  Keep trying.  In addition to reducing your risk of diseases in the future, you will notice some benefits soon after you stop using tobacco. If you have shortness of breath or asthma symptoms, they will likely getbetter within a few weeks after you quit.  Limit how much alcohol you drink. Moderate amounts of alcohol (up to 2 drinks a day for men, 1drink a day for women) are okay. But drinking too much can lead to liver problems, high blood pressure, and other health problems.  health   If you have a family, there are many things you can do together to improve your health.  Eat meals together as a family as often as possible.  Eat healthy foods. This includes fruits, vegetables, lean meats and dairy, and whole grains.  Include your family in your fitness plan. Most peoplethink of activities such as jogging or tennis as the way to fitness, but there are many ways you and your family can be more active. Anything that makes you breathe hard and gets your heart pumping is exercise. Here are sometips:   Walk to do errands or to take your child to school or the bus.  Go for a family bike ride after dinner instead of watching TV.  Where can you learn more?  Go [email or web address].[email or web address] org and sign in to your [employer] account. Enter J540 in the Search HealthInformation box to learn more about \"A Healthy Lifestyle: Care Instructions. \"     If you do not have anaccount, please click on the \"Sign Up Now\" link.   Current as of: July 26, 2016   Content Version: 11.2   © 8420-4446 [employer]. Care instructions adapted under license by [facility] ([facility]). If you have questions about a medical condition or this instruction, always ask your healthcare professional. [employer] disclaims any warranty or liability for your use of this information.

## 2022-01-27 ENCOUNTER — OFFICE VISIT (OUTPATIENT)
Dept: NEUROLOGY | Age: 22
End: 2022-01-27
Payer: COMMERCIAL

## 2022-01-27 VITALS
HEART RATE: 96 BPM | DIASTOLIC BLOOD PRESSURE: 76 MMHG | HEIGHT: 70 IN | OXYGEN SATURATION: 97 % | SYSTOLIC BLOOD PRESSURE: 124 MMHG | BODY MASS INDEX: 26.92 KG/M2 | WEIGHT: 188 LBS

## 2022-01-27 DIAGNOSIS — R41.3 MEMORY LOSS: ICD-10-CM

## 2022-01-27 DIAGNOSIS — F41.9 ANXIETY: ICD-10-CM

## 2022-01-27 DIAGNOSIS — G43.901 STATUS MIGRAINOSUS: Primary | ICD-10-CM

## 2022-01-27 DIAGNOSIS — R68.84 JAW PAIN: ICD-10-CM

## 2022-01-27 DIAGNOSIS — G43.019 INTRACTABLE MIGRAINE WITHOUT AURA AND WITHOUT STATUS MIGRAINOSUS: ICD-10-CM

## 2022-01-27 DIAGNOSIS — M54.2 NECK PAIN: ICD-10-CM

## 2022-01-27 DIAGNOSIS — F32.0 CURRENT MILD EPISODE OF MAJOR DEPRESSIVE DISORDER, UNSPECIFIED WHETHER RECURRENT (HCC): ICD-10-CM

## 2022-01-27 DIAGNOSIS — R42 DIZZINESS: ICD-10-CM

## 2022-01-27 DIAGNOSIS — F90.8 ATTENTION DEFICIT HYPERACTIVITY DISORDER (ADHD), OTHER TYPE: ICD-10-CM

## 2022-01-27 DIAGNOSIS — R41.3 MEMORY PROBLEM: ICD-10-CM

## 2022-01-27 DIAGNOSIS — G44.219 EPISODIC TENSION-TYPE HEADACHE, NOT INTRACTABLE: ICD-10-CM

## 2022-01-27 DIAGNOSIS — G47.9 SLEEP DIFFICULTIES: ICD-10-CM

## 2022-01-27 PROCEDURE — 99215 OFFICE O/P EST HI 40 MIN: CPT | Performed by: PSYCHIATRY & NEUROLOGY

## 2022-01-27 PROCEDURE — G8484 FLU IMMUNIZE NO ADMIN: HCPCS | Performed by: PSYCHIATRY & NEUROLOGY

## 2022-01-27 PROCEDURE — G8419 CALC BMI OUT NRM PARAM NOF/U: HCPCS | Performed by: PSYCHIATRY & NEUROLOGY

## 2022-01-27 PROCEDURE — 1036F TOBACCO NON-USER: CPT | Performed by: PSYCHIATRY & NEUROLOGY

## 2022-01-27 PROCEDURE — G8427 DOCREV CUR MEDS BY ELIG CLIN: HCPCS | Performed by: PSYCHIATRY & NEUROLOGY

## 2022-01-27 PROCEDURE — 99214 OFFICE O/P EST MOD 30 MIN: CPT | Performed by: PSYCHIATRY & NEUROLOGY

## 2022-01-27 RX ORDER — SUMATRIPTAN 100 MG/1
100 TABLET, FILM COATED ORAL
Qty: 12 TABLET | Refills: 2 | Status: SHIPPED | OUTPATIENT
Start: 2022-01-27 | End: 2022-03-02

## 2022-01-27 RX ORDER — OMEPRAZOLE 20 MG/1
20 CAPSULE, DELAYED RELEASE ORAL DAILY
Qty: 30 CAPSULE | Refills: 2 | Status: SHIPPED | OUTPATIENT
Start: 2022-01-27

## 2022-01-27 RX ORDER — NAPROXEN 500 MG/1
500 TABLET ORAL 2 TIMES DAILY WITH MEALS
Qty: 60 TABLET | Refills: 1 | Status: SHIPPED | OUTPATIENT
Start: 2022-01-27 | End: 2023-01-27

## 2022-01-27 RX ORDER — ONDANSETRON 4 MG/1
4 TABLET, ORALLY DISINTEGRATING ORAL EVERY 8 HOURS PRN
Qty: 30 TABLET | Refills: 1 | Status: SHIPPED | OUTPATIENT
Start: 2022-01-27

## 2022-01-27 RX ORDER — BUTALBITAL, ACETAMINOPHEN AND CAFFEINE 50; 325; 40 MG/1; MG/1; MG/1
TABLET ORAL
Qty: 60 TABLET | Refills: 1 | Status: SHIPPED | OUTPATIENT
Start: 2022-01-27

## 2022-01-27 RX ORDER — TRAZODONE HYDROCHLORIDE 100 MG/1
TABLET ORAL
Qty: 30 TABLET | Refills: 2 | Status: SHIPPED | OUTPATIENT
Start: 2022-01-27

## 2022-01-27 RX ORDER — TOPIRAMATE 100 MG/1
TABLET, FILM COATED ORAL
Qty: 60 TABLET | Refills: 2 | Status: SHIPPED | OUTPATIENT
Start: 2022-01-27

## 2022-01-27 ASSESSMENT — ENCOUNTER SYMPTOMS
EYE WATERING: 0
FACIAL SWEATING: 0
RHINORRHEA: 0
SHORTNESS OF BREATH: 0
FACIAL SWELLING: 0
ABDOMINAL DISTENTION: 0
BACK PAIN: 0
TROUBLE SWALLOWING: 0
APNEA: 0
BLOOD IN STOOL: 0
BLURRED VISION: 0
EYE ITCHING: 0
PHOTOPHOBIA: 1
CHEST TIGHTNESS: 0
VOICE CHANGE: 0
EYE PAIN: 0
SINUS PRESSURE: 0
SCALP TENDERNESS: 0
EYE REDNESS: 0
WHEEZING: 0
COLOR CHANGE: 0
CHOKING: 0
DIARRHEA: 0
CONSTIPATION: 0
EYE DISCHARGE: 0

## 2022-01-27 NOTE — PROGRESS NOTES
This writer contacted scheduling to schedule the following testing: EEG, CT head and cervical spine wo contrast - patient made aware, will contact office with further needs.

## 2022-01-27 NOTE — PROGRESS NOTES
[facility]  Neurology  [address] [place] [address]  GZIDX:[phone]   Fax: [phone]    SUBJECTIVE:     PATIENT ID:  Barry Hunter is a  RIGHT    HANDED 24 y.o. male. Migraine   This is a chronic problem. Episode onset: SINCE    AGE OF   6  YEARS. The problem occurs intermittently. The problem has been waxing and waning. The pain is located in the left unilateral region. The pain does not radiate. The pain quality is not similar to prior headaches. The quality of the pain is described as aching, stabbing, pulsating and throbbing. The pain is at a severity of 3/10. The pain is mild. Associated symptoms include dizziness, phonophobia and photophobia. Pertinent negatives include no abnormal behavior, back pain, blurred vision, drainage, ear pain, eye pain, eye redness, eye watering, facial sweating, hearing loss, insomnia, loss of balance, muscle aches, rhinorrhea, scalp tenderness, seizures, sinus pressure, tingling, tinnitus or weight loss. The symptoms are aggravated by bright light, emotional stress and noise. Treatments tried: TOPAMAX. The treatment provided significant relief. His past medical history is significant for migraine headaches and recent head traumas. There is no history of cancer, cluster headaches, hypertension, immunosuppression, migraines in the family, obesity, pseudotumor cerebri, sinus disease or TMJ. History obtained from  The patient     and other  available   medical  records   were  Also  reviewed. The  Duration,  Quality,  Severity,  Location,  Timing,  Context,  Modifying  Factors   Of   The   Chief   Complaint       And  Present  Illness  Was   Reviewed   In   Chronological   Manner.                                                 PATIENT'S  MAIN  CONCERNS INCLUDE :                       1)     H/O    CHRONIC    TENSION   HEADACHES                                             SINCE  AGE OF   6   YEARS 2)      H/O    CHRONIC   MIGRAINES  SINCE  AGE OF   6   YEARS                                     ASSOCIATED    WITH     DIZZINESS,    NAUSEA    AND  VOMITING                                              1- 2    TIMES     PER  WEEK     LASTING  FOR     4 - 5  HOURS                             2)       HAD   NEUROLOGY    EVALUATION      AT  [facility]     IN   2018                                              HAD   TRIED    TOPAMAX  IN    THE PAST     AND                                                     IT  DID  NOT  HELP                                                        3)      H/O       CHRONIC      ANXIETY  ,   DEPRESSION                                          AND   ADHD    SINCE     AGE    5 - 6  YEARS                                PATIENT  TO  FOLLOW  WITH  MENTAL  HEALTH PROFESSION                           4)       FAMILY     H/O     MIGRAINES                           5)     PATIENT  DENIES  ANY    BIRTH  AND  DEVELOPMENTAL PROBLEMS                                NO     H/O   HEAD  INJURY  IN   THE PAST                                           6)    H/O     CHRONIC  SLEEP   DIFFICULTIES                                  -  BETTER  ON  TRAZODONE                           7)     CHRONIC  MILD  MEMORY PROBLEMS                                       -    NEEDS   MONITORING                             7)         HAD   NEURO  DIAGNOSTIC  EVALUATIONS  IN   SEPT. 2020                             A)       MRI  BRAIN  AND  EEG    DID  NOT  SHOW                                           ANY  SIGNIFICANT  ABNORMALITIES                            B)    LABS    IN  OCT. 2020       ARE    WITH IN  NORMAL  LIMITS                                         8)      PATIENT      STARTED    ON       TOPAMAX,  IMITREX                                     TRAZODONE      IN    OCT.    2020     FOR                                 HIS  CHRONIC  HEADACHES  AND    MIGRAINES , SLEEP  DIFFICULTIES                                                 PATIENT  TOLERATING   THE   SAME      AND     GETTING   BENEFIT. 9)         UBRALEVY   TABLETS    NOT  APPROVED   BY  HIS INSURANCE                      10)       PROLONGED   MIGRAINE   WITH  NAUSEA    AND   VOMITING                                                     IN      SEPT. 2021                                 PATIENT    WAS    SEEN   ER       AND   TREATED    WITH                                 INJECTION    TORADOL                          11)       PATIENT'S    MIGRAINES  AND  HEADACHES      ARE   FAIRLY                                WELL  CONTROLLED     IN    OCT.  2021                          12)      H/O     SEVERE      MIGRAINES        REQUIRING      ER    VISITS                                        4    TIMES       REQUIRING   INJECTABLE    MEDICATIONS                                             SINCE   NOV. 2021                            13)       H/O     INCREASED   MEMORY  LOSS ,   DIZZINESS                                                NECK  PAIN,    LEFT   JAW  PAIN                                               AND   SLEEP   DIFFICULTIES                                               SINCE       NOV. 2021                                     14)           EXPECTATIONS,   GOALS   OF    HEADACHE    &   MIGRAINE     MANAGEMENT                                   AND  SIDE  EFFECTS  MEDICATIONS    WERE                                        REVIEWED     AND   DISCUSSED    IN    DETAIL.                                               15)            RECOMMENDED    :                                 A)    FOLLOW  UP  NEURO  DIAGNOSTIC  EVALUATION                                B)        TO   CONTINUE      IMITREX,    TOPAMAX,    NAPROXEN                                                  TRAZODONE        AS    BEFORE                                    C)    AIMOVIG    INJECTION MONTHLY      FOR  MIGRAINE  PROPHYLAXIS                                D)    FIORICET      AS   NEEDED                                  16)           PATIENT  MANAGEMENT      CHALLENGING        DUE     TO                                   [place] [facility]      MEDICAL,   NEUROLOGICAL     AND    MENTAL  HEALTH    PROBLEMS.                                     PRECIPITATING  FACTORS: including  fever/infection, exertion/relaxation, position change, stress, weather change,                        medications/alcohol, time of day/darkness/light  Are  present                                                              MODIFYING  FACTORS:  fever/infection, exertion/relaxation, position change, stress, weather change,                        medications/alcohol, time of day/darkness/light  Are  present             Patient   Indicates   The  Presence   And  The  Absence  Of  The  Following    Associated  And       Additional  Neurological    Symptoms:                                Balance  And coordination   problems  absent           Gait problems     absent            Headaches      present              Migraines           present           Memory problemsabsent              Confusion        absent            Paresthesia   numbness          absent           Seizures  And  Starring  Episodes           absent           Syncope,  Near  syncopal episodes         absent           Speech   problems           absent             Swallowing   Problems      absent            Dizziness,  Light headedness           present              Vertigo        absent             Generalized   Weakness    absent              focal  Weakness     absent             Tremors         absent              Sleep  Problems     absent             History  Of   Recent  Head  Injury     absent             History  Of   Recent  TIA     absent             History  Of   Recent    Stroke     absent             Neck  Pain   and   Neck muscle  Spasms  absent Radiating  down   And   Weakness           absent            Lower back   Pain  And     Spasms  absent              Radiating    Down   And   Weakness          absent                H/OFALLS        absent               History  Of   Visual  Symptoms    absent                  Associated   Diplopia       absent                                               Also   Additional   Symptoms   Present    As  Documented    In   The   detailed                  Review  Of  Systems   And    Please   Refer   To    Them for   Additional    Information. Any components  That are either  Unobtainable  Or  Limited  In   HPI, ROS  And/or PFSH   Are                   Due   ToPatient's  Medical  Problems,  Clinical  Condition   and/or lack of                                other    Alternate   resources.           RECORDS   REVIEWED:    historical medical records       INFORMATION   REVIEWED:     MEDICAL   HISTORY,SURGICAL   HISTORY,     MEDICATIONS   LIST,   ALLERGIES AND  DRUG  INTOLERANCES,       FAMILY   HISTORY,  SOCIAL  HISTORY,      PROBLEM  LIST   FOR  PATIENT  CARE   COORDINATION      Past Medical History:   Diagnosis Date    ADHD (attention deficit hyperactivity disorder)     Anxiety     Depression     Epigastric pain     Headache     Weight gain          Past Surgical History:   Procedure Laterality Date    COLONOSCOPY  03/27/2019    [facility]    HARDWARE REMOVAL  01/17/2019    LEG SURGERY Right 10/2017    UPPER GASTROINTESTINAL ENDOSCOPY      [facility]         Current Outpatient Medications   Medication Sig Dispense Refill    topiramate (TOPAMAX) 100 MG tablet ONE  TABLET   TWICE  DAILY 60 tablet 2    SUMAtriptan (IMITREX) 100 MG tablet Take 1 tablet by mouth once as needed for Migraine 12 tablet 2    naproxen (EC-NAPROSYN) 500 MG EC tablet Take 1 tablet by mouth 2 times daily (with meals) AS  NEEDED 60 tablet 1    omeprazole (PRILOSEC) 20 MG delayed release capsule Take 1 capsule by mouth daily 30 capsule 2    ondansetron (ZOFRAN ODT) 4 MG disintegrating tablet Take 1 tablet by mouth every 8 hours as needed for Nausea or Vomiting 30 tablet 1    traZODone (DESYREL) 100 MG tablet ONE  TABLET   AT  BEDTIME 30 tablet 2    Erenumab-aooe 140 MG/ML SOAJ Inject 140 mg into the skin every 30 days 1 pen 2    butalbital-acetaminophen-caffeine (ESGIC) -40 MG per tablet ONE  TO  TWO  TABLETS  IN  THE  EVENING  AND  BED TIME  AS  NEEDED 60 tablet 1    albuterol sulfate HFA (PROVENTIL HFA) 108 (90 Base) MCG/ACT inhaler Inhale 2 puffs into the lungs      VENTOLIN  (90 Base) MCG/ACT inhaler inhale 2 puffs by mouth every 4 hours if needed  0     No current facility-administered medications for this visit.          Allergies   Allergen Reactions    Dilaudid [Hydromorphone Hcl]     Methylprednisolone Other (See Comments)     Headaches (made migraines worse)    Prozac [Fluoxetine Hcl]     Trazodone Other (See Comments)     Sometimes woke up with headache    Zoloft [Sertraline Hcl]     Amoxicillin Rash    Meperidine Nausea And Vomiting         Family History   Problem Relation Age of Onset    High Blood Pressure Mother     Asthma Mother     COPD Mother     Arthritis Mother         Rhuematoid    Heart Disease Mother     Bipolar Disorder Mother     Heart Attack Maternal Grandmother     Early Death Maternal Grandmother 61    Heart Attack Maternal Grandfather     Early Death Maternal Grandfather 61    Heart Disease Paternal Grandmother     Diabetes Paternal Grandmother     Colon Cancer Maternal Aunt 54    Crohn's Disease Neg Hx     Ulcerative Colitis Neg Hx     Colon Polyps Neg Hx          Social History     Socioeconomic History    Marital status: Single     Spouse name: Not on file    Number of children: Not on file    Years of education: Not on file    Highest education level: Not on file   Occupational History    Not on file   Tobacco Use    Smoking status: Passive Smoke Exposure - Never Smoker    Smokeless tobacco: Never Used    Tobacco comment: Former user- for about 6 months   Vaping Use    Vaping Use: Former    Quit date: 1/1/2019    Substances: Always   Substance and Sexual Activity    Alcohol use: No    Drug use: No    Sexual activity: Not on file   Other Topics Concern    Not on file   Social History Narrative    Not on file     Social Determinants of Health     Financial Resource Strain:     Difficulty of Paying Living Expenses: Not on file   Food Insecurity:     Worried About Running Out of Food in the Last Year: Not on file    Pierre of Food in the Last Year: Not on file   Transportation Needs:     Lack of Transportation (Medical): Not on file    Lack of Transportation (Non-Medical):  Not on file   Physical Activity:     Days of Exercise per Week: Not on file    Minutes of Exercise per Session: Not on file   Stress:     Feeling of Stress : Not on file   Social Connections:     Frequency of Communication with Friends and Family: Not on file    Frequency of Social Gatherings with Friends and Family: Not on file    Attends Holiness Services: Not on file    Active Member of [address] [place] [address] or Organizations: Not on file    Attends Club or Organization Meetings: Not on file    Marital Status: Not on file   Intimate Partner Violence:     Fear of Current or Ex-Partner: Not on file    Emotionally Abused: Not on file    Physically Abused: Not on file    Sexually Abused: Not on file   Housing Stability:     Unable to Pay for Housing in the Last Year: Not on file    Number of [place] in the Last Year: Not on file    Unstable Housing in the Last Year: Not on file       Vitals:    01/27/22 0903   BP: 124/76   Pulse: 96   SpO2: 97%         Wt Readings from Last 3 Encounters:   01/27/22 188 lb (85.3 kg)   10/21/21 176 lb (79.8 kg)   08/12/21 179 lb (81.2 kg)         BP Readings from Last 3 Encounters:   01/27/22 124/76   10/21/21 122/84   08/12/21 138/82             Review of Systems   Constitutional: Negative for appetite change, unexpected weight change and weight loss. HENT: Negative for dental problem, drooling, ear discharge, ear pain, facial swelling, hearing loss, mouth sores, nosebleeds, postnasal drip, rhinorrhea, sinus pressure, tinnitus, trouble swallowing and voice change. Eyes: Positive for photophobia. Negative for blurred vision, pain, discharge, redness, itching and visual disturbance. Respiratory: Negative for apnea, choking, chest tightness, shortness of breath and wheezing. Cardiovascular: Negative for palpitations and leg swelling. Gastrointestinal: Negative for abdominal distention, blood in stool, constipation and diarrhea. Endocrine: Negative for cold intolerance, heat intolerance, polydipsia, polyphagia and polyuria. Musculoskeletal: Negative for back pain, gait problem and neck stiffness. Skin: Negative for color change, pallor and wound. Allergic/Immunologic: Negative for environmental allergies, food allergies and immunocompromised state. Neurological: Positive for dizziness. Negative for tingling, tremors, seizures, syncope, facial asymmetry, speech difficulty, light-headedness and loss of balance. Hematological: Negative for adenopathy. Does not bruise/bleed easily. Psychiatric/Behavioral: Positive for decreased concentration. Negative for agitation, behavioral problems, confusion, dysphoric mood, hallucinations, self-injury, sleep disturbance and suicidal ideas. The patient is nervous/anxious. The patient does not have insomnia and is not hyperactive. OBJECTIVE:    Physical Exam  Constitutional:       Appearance: He is well-developed. HENT:      Head: Normocephalic and atraumatic. No raccoon eyes or Gordon's sign.       Right Ear: External ear normal.      Left Ear: External ear normal.      Nose: Nose normal.   Eyes:      Conjunctiva/sclera: Conjunctivae normal.      Pupils: Pupils are equal, round, and reactive to light. Neck:      Thyroid: No thyroid mass or thyromegaly. Vascular: No carotid bruit. Trachea: No tracheal deviation. Meningeal: Brudzinski's sign and Kernig's sign absent. Cardiovascular:      Rate and Rhythm: Normal rate and regular rhythm. Pulmonary:      Effort: Pulmonary effort is normal.   Musculoskeletal:         General: No tenderness. Normal range of motion. Cervical back: Normal range of motion and neck supple. No rigidity. No muscular tenderness. Normal range of motion. Skin:     General: Skin is warm. Coloration: Skin is not pale. Findings: No erythema or rash. Nails: There is no clubbing. Psychiatric:         Attention and Perception: He is attentive. Mood and Affect: Mood is anxious. Mood is not depressed. Affect is not labile, blunt or inappropriate. Speech: He is communicative. Speech is not rapid and pressured, delayed, slurred or tangential.         Behavior: Behavior is not agitated, slowed, aggressive, withdrawn, hyperactive or combative. Behavior is cooperative. Thought Content: Thought content is not paranoid or delusional. Thought content does not include homicidal or suicidal ideation. Thought content does not include homicidal or suicidal plan. Cognition and Memory: Memory is not impaired. He does not exhibit impaired recent memory or impaired remote memory. Judgment: Judgment is not impulsive or inappropriate. NEUROLOGICALEXAMINATION :       A) MENTAL STATUS:                   Alert and  oriented  To time, place  And  Person. No Aphasia. No  Dysarthria. Able   To  Follow   THREE    Stepcommands   without   Any  Difficulty. No right  To left confusion. Normal  Speech  And language function.                  Insight and  Judgment ,Fund  Of  Knowledge   within normal limits                Recent  And Remote memory  within   normal limits                Attention &  Concentration are within   normal limits                                                 B) CRANIAL NERVES :      CN : Visual  Acuity  And  Visual fields  within normal limits               Fundi  Could  Not  Be  Could  Not  Be  Evaluated. 3,4,6 CN : Both  Pupils are   Reactive and  Equal.  Movements  Are  Intact. No  Nystagmus. No  MANUEL. No  Afferent  Pupillary  Defect noted. 5 CN :  Normal  Facial sensations and Corneal  Reflexes           7 CN:  Normal  Facial  Symmetry  And  Strength. No facial  Weakness. 8 CN :  Hearing  Appears within normal limits          9, 10 CN: Normal   spontaneous, reflex   palate   movements         11 CN:   Normal  Shoulder  shrug and  strength         12 CN :   Normal  Tongue movements and  Tongue  In midline                        No tongue   Fasciculations or atrophy       C) MOTOR  EXAM:                 Strength  In upper  And  Lower   extremities   within   normal limits               No  Drift. No  Atrophy               Rapid   alternating  And  repetitions  Movements  within   normal limits               Muscle  Tone  In upper  And  Lower  Extremities  normal                No rigidity. No  Spasticity. Bradykinesia   absent               No  Asterixis. Sustention  Tremor , Resting   Tremor   absent                    No   other  Abnormal  Movements noted           D) SENSORY :               Light   touch, pinprick,   position  And  Vibration  within normal limits        E) REFLEXES:                   Deep  Tendon  Reflexes   normal                  No  pathological  Reflexes  Bilaterally.                                   F) COORDINATION  AND  GAIT :                                Station and  Gait  normal                              Romberg 's test negative                          Ataxia negative      ASSESSMENT:        Patient Active Problem List   Diagnosis    Electronic cigarette use    ADHD (attention deficit hyperactivity disorder)    Anxiety    Depression    Headache    Migraine without aura and without status migrainosus, not intractable    Dizziness    Sleep difficulties    Chronic tension-type headache, intractable    Memory problem    Episodic tension-type headache, not intractable    Neck pain    Memory loss    Jaw pain, non-TMJ    Status migrainosus       MRI OF THE BRAIN WITHOUT AND WITH CONTRAST  9/11/2020 1:15 pm         TECHNIQUE:    Multiplanar multisequence MRI of the head/brain was performed without and    with the administration of intravenous contrast.         COMPARISON:    None.         HISTORY:    ORDERING SYSTEM PROVIDED HISTORY: Chronic nonintractable headache,    unspecified headache type    TECHNOLOGIST PROVIDED HISTORY:    Reason for Exam:  Chronic migraine headaches, worse recently. Acuity: Chronic    Type of Exam: Unknown    Additional signs and symptoms: Chronic non intractable headache, unspecified    headache type; Migraine without aura and without status migrainosus, not    intractable; Dizziness.         FINDINGS:    Motion degrades images limiting evaluation.         INTRACRANIAL STRUCTURES/VENTRICLES: Lunette Jake is no acute infarct. No mass    effect or midline shift. No evidence of an acute intracranial hemorrhage.     The ventricles and sulci are normal in size and configuration.  The    sellar/suprasellar regions appear unremarkable.  The normal signal voids    within the major intracranial vessels appear maintained.  No abnormal focus    of enhancement is seen within the brain.         ORBITS: The visualized portion of the orbits demonstrate no acute abnormality.         SINUSES: Mild scattered mucosal thickening of the paranasal sinuses.  The    mastoid air cells demonstrate no acute abnormality.         BONES/SOFT TISSUES: The bone marrow signal intensity appears normal. The soft    tissues demonstrate no acute abnormality.              Impression    1. Patient motion partially limits evaluation. 2. Grossly unremarkable MRI of the brain. 3. Mild scattered mucosal thickening of the paranasal sinuses. VISITING DIAGNOSIS:            ICD-10-CM    1. Status migrainosus  G43.901    2. Episodic tension-type headache, not intractable  G44.219 Sedimentation Rate     CBC     CHRISSY Screen with Reflex     Rheumatoid Factor     Lyme Ab     HERPES PROFILE     Electrolyte Panel     Lupus Anticoagulant     TSH     Vitamin B12 & Folate     T. pallidum Ab     EEG     CT HEAD WO CONTRAST     CT CERVICAL SPINE WO CONTRAST   3. Sleep difficulties  G47.9 Sedimentation Rate     CBC     CHRISSY Screen with Reflex     Rheumatoid Factor     Lyme Ab     HERPES PROFILE     Electrolyte Panel     Lupus Anticoagulant     TSH     Vitamin B12 & Folate     T. pallidum Ab     EEG     CT HEAD WO CONTRAST     CT CERVICAL SPINE WO CONTRAST   4. Current mild episode of major depressive disorder, unspecified whether recurrent ([facility])  F32.0 Sedimentation Rate     CBC     CHRISSY Screen with Reflex     Rheumatoid Factor     Lyme Ab     HERPES PROFILE     Electrolyte Panel     Lupus Anticoagulant     TSH     Vitamin B12 & Folate     T. pallidum Ab     EEG     CT HEAD WO CONTRAST     CT CERVICAL SPINE WO CONTRAST   5.  Memory problem  R41.3 Sedimentation Rate     CBC     CHRISSY Screen with Reflex     Rheumatoid Factor     Lyme Ab     HERPES PROFILE     Electrolyte Panel     Lupus Anticoagulant     TSH     Vitamin B12 & Folate     T. pallidum Ab     EEG     CT HEAD WO CONTRAST     CT CERVICAL SPINE WO CONTRAST   6. Dizziness  R42 Sedimentation Rate     CBC     CHRISSY Screen with Reflex     Rheumatoid Factor     Lyme Ab     HERPES PROFILE     Electrolyte Panel     Lupus Anticoagulant     TSH     Vitamin B12 & Folate     T. pallidum Ab     EEG     CT HEAD WO CONTRAST     CT CERVICAL SPINE WO CONTRAST   7. Attention deficit hyperactivity disorder (ADHD), other type  F90.8 Sedimentation Rate     CBC     CHRISSY Screen with Reflex     Rheumatoid Factor     Lyme Ab     HERPES PROFILE     Electrolyte Panel     Lupus Anticoagulant     TSH     Vitamin B12 & Folate     T. pallidum Ab     EEG     CT HEAD WO CONTRAST     CT CERVICAL SPINE WO CONTRAST   8. Anxiety  F41.9 Sedimentation Rate     CBC     CHRISSY Screen with Reflex     Rheumatoid Factor     Lyme Ab     HERPES PROFILE     Electrolyte Panel     Lupus Anticoagulant     TSH     Vitamin B12 & Folate     T. pallidum Ab     EEG     CT HEAD WO CONTRAST     CT CERVICAL SPINE WO CONTRAST   9. Intractable migraine without aura and without status migrainosus  G43.019 Sedimentation Rate     CBC     CHRISSY Screen with Reflex     Rheumatoid Factor     Lyme Ab     HERPES PROFILE     Electrolyte Panel     Lupus Anticoagulant     TSH     Vitamin B12 & Folate     T. pallidum Ab     EEG     CT HEAD WO CONTRAST     CT CERVICAL SPINE WO CONTRAST   10. Neck pain  M54.2 Sedimentation Rate     CBC     CHRISSY Screen with Reflex     Rheumatoid Factor     Lyme Ab     HERPES PROFILE     Electrolyte Panel     Lupus Anticoagulant     TSH     Vitamin B12 & Folate     T. pallidum Ab     EEG     CT HEAD WO CONTRAST     CT CERVICAL SPINE WO CONTRAST   11.  Memory loss  R41.3 Sedimentation Rate     CBC     CHRISSY Screen with Reflex     Rheumatoid Factor     Lyme Ab     HERPES PROFILE     Electrolyte Panel     Lupus Anticoagulant     TSH     Vitamin B12 & Folate     T. pallidum Ab     EEG     CT HEAD WO CONTRAST     CT CERVICAL SPINE WO CONTRAST   12. Jaw pain  R68.84 Sedimentation Rate     CBC     CHRISSY Screen with Reflex     Rheumatoid Factor     Lyme Ab     HERPES PROFILE     Electrolyte Panel     Lupus Anticoagulant     TSH     Vitamin B12 & Folate     T. pallidum Ab     EEG     CT HEAD WO CONTRAST     CT CERVICAL SPINE WO CONTRAST                    CONCERNS   &   INCREASED   RISK   FOR          * POORLY  CONTROLLED   &  COMPLICATED  MIGRAINES         *    CHRONIC  TENSION  HEADACHES         *     MEMORY  LOSS               *    SEIZURE   ACTIVITY             *     DIZZINESS                   VARIOUS  RISK   FACTORS   WERE  REVIEWED   AND   DISCUSSED. PLAN:                         Maty Munoz  Of  The  Diagnoses,  The  Management & Treatment  Options            AND    Care  plan  Were          Reviewed and   Discussed   With  patient. * Goals  And  Expectations  Of  The  Therapy  Discussed   And  Reviewed. *   Benefits   And   Side  Effect  Profile  Of  Medication/s   Were   Discussed                * Need   For  Further   Follow up For  The  Various  Problems Were  discussed. * Results  Of  The  Previous  Diagnostic tests were reviewed and  discussed                 Medical  Decision  Making  Was  Made  Based on the   Complexity  Of  Above  Mentioned  Diagnoses,    Data reviewed             And    Risk  Of  Significant   Co morbidities and   complicating   Factors. Medical  Decision  Was   High  Complexity   Due   To  The  Patient's  Multiple  Symptoms,       Complex  Treatment  Regimen,  Multiple medications           and   Risk  Of   Side  Effects,  Difficulty  In  Medication  Management  And  Diagnostic  Challenges       In  View  Of  The  Associated   Co  Morbid  Conditions   And  Problems. *   BE  CAREFUL  WITH  ACTIVITIES   INCLUDING  DRIVING. *   ADEQUATE   FLUIDINTAKE   AND  ELECTROLYTE  BALANCE             * KEEP  DAIRY  OF   THE  NEUROLOGICAL  SYMPTOMS        RECORDING THE    DURATION  AND  FREQUENCY. *  AVOID    CONDITIONS  AND  FACTORS   THAT  MAKE                  NEUROLOGICAL  SYMPTOMS  WORSE.                  *TO  MAINTAIN  REGULAR  SLEEP  WAKE  CYCLES.      *   TO  HAVE  ADEQUATE  REST  AND   SLEEP    HOURS.            *    AVOID  ANY USAGE OF    TOBACCO,              EXCESSIVE  ALCOHOL  AND   ILLEGAL SUBSTANCES          *  CONTINUE   MEDICATIONS    PRESCRIBED     AS    RECOMMENDED       *   Compliance   With  Medications   And  Instructions          *    MIGRAINE/ HEADACHE    DAIRY   WITH  MONITORING                       OF  DURATION  AND  FREQUENCY.             *    Prophylactic  Use   Of     Vitamin   B   Complex,  Folic  Acid,    Vitamin  B12    Multivitamin,       Calcium  With  magnesium  And  Vit D    Supplementations   Over  The  Counter  Discussed                                      Orders Placed This Encounter   Procedures    CT HEAD WO CONTRAST    CT CERVICAL SPINE WO CONTRAST    Sedimentation Rate    CBC    CHRISSY Screen with Reflex    Rheumatoid Factor    Lyme Ab    HERPES PROFILE    Electrolyte Panel    Lupus Anticoagulant    TSH    Vitamin B12 & Folate    T. pallidum Ab    EEG       Orders Placed This Encounter   Medications    topiramate (TOPAMAX) 100 MG tablet     Sig: ONE  TABLET   TWICE  DAILY     Dispense:  60 tablet     Refill:  2    SUMAtriptan (IMITREX) 100 MG tablet     Sig: Take 1 tablet by mouth once as needed for Migraine     Dispense:  12 tablet     Refill:  2    naproxen (EC-NAPROSYN) 500 MG EC tablet     Sig: Take 1 tablet by mouth 2 times daily (with meals) AS  NEEDED     Dispense:  60 tablet     Refill:  1    omeprazole (PRILOSEC) 20 MG delayed release capsule     Sig: Take 1 capsule by mouth daily     Dispense:  30 capsule     Refill:  2    ondansetron (ZOFRAN ODT) 4 MG disintegrating tablet     Sig: Take 1 tablet by mouth every 8 hours as needed for Nausea or Vomiting     Dispense:  30 tablet     Refill:  1    traZODone (DESYREL) 100 MG tablet     Sig: ONE  TABLET   AT  BEDTIME     Dispense:  30 tablet     Refill:  2    Erenumab-aooe 140 MG/ML SOAJ     Sig: Inject 140 mg into the skin every 30 days     Dispense:  1 pen     Refill:  2    butalbital-acetaminophen-caffeine (ESGIC) -40 MG per tablet     Sig: ONE  TO  TWO  TABLETS  IN  THE  EVENING  AND  BED TIME  AS  NEEDED     Dispense:  60 tablet     Refill:  1                       *PATIENT   TO  FOLLOW  UP  WITH   PRIMARY  CARE         OTHER  CONSULTANTS  AS  BEFORE.           *TO  FOLLOW  WITH   MENTAL  HEALTH  PROFESSIONALS ,  INCLUDING            PSYCHOLOGICAL  COUNSELING   AND  PSYCHIATRIC  EVALUTIONS,                   *  Maintain   Healthy  Life Style    With   Periodic  Monitoring  Of      Any  Medical  Conditions  Including   Elevated  Blood  Pressure,  Lipid  Profile,     Blood  Sugar levels  AndHeart  Disease. *   Period   Screening  For  Cancers  Involving  Breast,  Colon,    lungs  And  Other  Organs  As  Applicable,  In consultation   With  Your  Primary Care Providers. *Second  Neurological  Opinion  And  Evaluations  In  [facility] AND [facility]  Setting  If  Patient  Is  Interested. * Please   Contact   Neurology  Clinic   Early   If   Are  Any  New  Neurological   And  Any neurological  Concerns. *  If  The  Patient remains  Neurologically  Stable   Return   To  [facility] Neurology Department   IN     1-2      MONTHS  TIME   FOR  FURTHER              FOLLOW UP.                       *   The  Neurological   Findings,  Possible  Diagnosis,  Differential diagnoses                    And  Options  For    Further   Investigations                   And  management   Are  Discussed  Comprehensively. Medications   And  Prescription   Risks  And  Side effects  Are   Also  Discussed. *  If   There is  Any  Significant  Worsening   Of  Current  Symptoms  And  Or                  If patient  Develops   Any additional  New  NeurologicalSymptoms                  Or  Significant  Concerns   Should  Call  911 or  Go  To  Emergency  Department                  For  Further  Immediate  Evaluation.                          The   Above  Were  Reviewed  With  patient   and                       questions Answered  In  Detail. More   Than  50% of face  To face Time   Was  Spent  On  Counseling                    And   Coordination  Of  Care   Of   Patient's  multiple   Neurological  Problems                         And   Comorbid  Medical   Conditions. TOTAL   TIME     SPENT :                On this date 1/27/2022 I have spent    40  minutes    reviewing previous notes, test results               and face to face time with the patient including   discussing the diagnosis and importance of compliance               with the treatment plan  as well as documenting on the day of the visit. Electronically signed by Elida Sanchez MD.,  [address] [place] [address]      Board Certified in  Neurology &  In  [id number] [address] [place] [address] of Psychiatry and Neurology ([facility])      DISCLAIMER:   Although every effort was made to ensure the accuracy of this  electronictranscription, some errors in transcription may have occurred. GENERAL PATIENT INSTRUCTIONS:      A Healthy Lifestyle: Care Instructions   Your Care Instructions   A healthy lifestyle can help you feel good, stay at ahealthy weight, and have plenty of energy for both work and play. A healthy lifestyle is something you can share with your whole family.  A healthy lifestyle also can lower your risk for serious health problems, such ashigh blood pressure, heart disease, and diabetes.  You can follow a few steps listed below to improve your health and the health of your family.  Follow-up careis a key part of your treatment and safety. Be sure to make and go to all appointments, and call your doctor if you are having problems. Its also a good idea to know your test results and keep a list of the medicines you take.  How can you care for yourself at home?  Do not eat too much sugar, fat, or fast foods. You can still have dessert and treats nowand then.  The goal is moderation.  Start small to improve your eating habits. Pay attention to portion sizes, drink less juice and soda pop, and eat more fruits and vegetables.  Eat a healthy amount of food. A 3-ounce serving of meat, for example, is about the size of a deck of cards. Fill the rest of your plate with vegetables and whole grains.  Limit theamount of soda and sports drinks you have every day. Drink more water when you are thirsty.  Eat at least 5 servings of fruits and vegetables every day. It may seem like a lot, but it is not hard to reach this goal. Aserving or helping is 1 piece of fruit, 1 cup of vegetables, or 2 cups of leafy, raw vegetables. Have an apple or some carrot sticks as an afternoon snack instead of a candy bar. Try to have fruits and/or vegetables at everymeal.   Make exercise part of your daily routine. You may want to start with simple activities, such as walking, bicycling, or slow swimming. Try carlos active 30 to 60 minutes every day. You do not need to do all 30 to 60 minutes all at once. For example, you can exercise 3 times a day for 10 or 20 minutes. Moderate exercise is safe for most people, but it is always agood idea to talk to your doctor before starting an exercise program.   Keep moving. [place] the lawn, work in the garden, or "[employer]. Take the stairs instead of the elevator at work.  If you smoke, quit. Peoplewho smoke have an increased risk for heart attack, stroke, cancer, and other lung illnesses. Quitting is hard, but there are ways to boost your chance of quitting tobacco for good.  Use nicotine gum, patches, or lozenges.  Ask your doctor about stop-smoking programs and medicines.  Keep trying.  In addition to reducing your risk of diseases in the future, you will notice some benefits soon after you stop using tobacco. If you have shortness of breath or asthma symptoms, they will likely getbetter within a few weeks after you quit.    Limit how much alcohol you drink. Moderate amounts of alcohol (up to 2 drinks a day for men, 1drink a day for women) are okay. But drinking too much can lead to liver problems, high blood pressure, and other health problems.  health   If you have a family, there are many things you can do together to improve your health.  Eat meals together as a family as often as possible.  Eat healthy foods. This includes fruits, vegetables, lean meats and dairy, and whole grains.  Include your family in your fitness plan. Most peoplethink of activities such as jogging or tennis as the way to fitness, but there are many ways you and your family can be more active. Anything that makes you breathe hard and gets your heart pumping is exercise. Here are sometips:   Walk to do errands or to take your child to school or the bus.  Go for a family bike ride after dinner instead of watching TV.  Where can you learn more?  Go to[employer]iest[employer]://[email or web address]. org and sign in to your [employer] account. Enter T160 in the Search HealthInformation box to learn more about \"A Healthy Lifestyle: Care Instructions. \"     If you do not have anaccount, please click on the \"Sign Up Now\" link.  Current as of: July 26, 2016   Content Version: 11.2   © 1471-3807 [employer]. Care instructions adapted under license by [facility] ([facility]). If you have questions about a medical condition or this instruction, always ask your healthcare professional. [employer] disclaims any warranty or liability for your use of this information.

## 2022-02-11 ENCOUNTER — HOSPITAL ENCOUNTER (OUTPATIENT)
Dept: NEUROLOGY | Age: 22
Discharge: HOME | End: 2022-02-11
Payer: COMMERCIAL

## 2022-02-11 ENCOUNTER — HOSPITAL ENCOUNTER (OUTPATIENT)
Dept: CT IMAGING | Age: 22
Discharge: HOME | End: 2022-02-13
Payer: COMMERCIAL

## 2022-02-11 DIAGNOSIS — F41.9 ANXIETY: ICD-10-CM

## 2022-02-11 DIAGNOSIS — F32.0 CURRENT MILD EPISODE OF MAJOR DEPRESSIVE DISORDER, UNSPECIFIED WHETHER RECURRENT (HCC): ICD-10-CM

## 2022-02-11 DIAGNOSIS — G43.019 INTRACTABLE MIGRAINE WITHOUT AURA AND WITHOUT STATUS MIGRAINOSUS: ICD-10-CM

## 2022-02-11 DIAGNOSIS — G44.219 EPISODIC TENSION-TYPE HEADACHE, NOT INTRACTABLE: ICD-10-CM

## 2022-02-11 DIAGNOSIS — G47.9 SLEEP DIFFICULTIES: ICD-10-CM

## 2022-02-11 DIAGNOSIS — M54.2 NECK PAIN: ICD-10-CM

## 2022-02-11 DIAGNOSIS — R68.84 JAW PAIN: ICD-10-CM

## 2022-02-11 DIAGNOSIS — R41.3 MEMORY LOSS: ICD-10-CM

## 2022-02-11 DIAGNOSIS — F90.8 ATTENTION DEFICIT HYPERACTIVITY DISORDER (ADHD), OTHER TYPE: ICD-10-CM

## 2022-02-11 DIAGNOSIS — R41.3 MEMORY PROBLEM: ICD-10-CM

## 2022-02-11 DIAGNOSIS — R42 DIZZINESS: ICD-10-CM

## 2022-02-11 PROCEDURE — 95813 EEG EXTND MNTR 61-119 MIN: CPT

## 2022-02-11 PROCEDURE — 72125 CT NECK SPINE W/O DYE: CPT

## 2022-02-11 PROCEDURE — 70450 CT HEAD/BRAIN W/O DYE: CPT

## 2022-02-11 NOTE — PROGRESS NOTES
EXTENDED EEG Completed with [facility]. PCP: Kevin Johnson    Ordering: Otilia De La Fuente Neurologist    Interpreting Physician: Maliha Mercedes Neurologist    Technician: Virginia Schaffer RN

## 2022-02-12 NOTE — PROCEDURES
Jong [address]                 [address] [place] [id number]                         ELECTROENCEPHALOGRAM (EEG) REPORT      PATIENT NAME: Watson Martin                    :        2000  MED REC NO:   [id number]                             ROOM:  ACCOUNT NO:   [de-identified]                           ADMIT DATE: 2022    PROVIDER:     Domi Yan MD    DATE OF STUDY:  2022      TECHNIQUE:  23 channels of EEG, 2 channels of EOG, 2 channel of EKG and  1 channel ground and 1 channel reference were recorded with [employer]  [employer] 32 Channel System utilizing the International 10/20 System  Protocol. Erick detection and seizure analysis protocols were utilized and the  study was reviewed using the comprehensive EEG monitoring. This is an extended EEG recorded for 1 hour and 2 minutes. CLINICAL DATA:        The patient is 24years old with a history of ADHD,  anxiety, depression, migraines, dizziness, headaches, memory problems,  memory loss. The purpose of the study is to evaluate for associated seizure activity. MEDICATIONS:  Topamax, Imitrex, trazodone, Fioricet. BACKGROUND ACTIVITY:      While the patient was awake, the background  activity consisted of fairly-regulated 9 Hz waveform seen over both  cerebral hemispheres reacting to eye opening. Intermittent brief frontal muscle artifacts noted. ACTIVATION PROCEDURES:    HYPERVENTILATION:  Hyperventilation was performed for 3 minutes. Patient  was cooperative with good effort. Also, post-hyperventilation period  was monitored closely. Hyperventilation:  Diffuse slowing noted. PHOTIC STIMULATION:  Photic stimulation was performed at the following  frequencies: 1 Hz, 3 Hz, 6 Hz, 9 Hz, 12 Hz, 15 Hz, 18 Hz, 21 Hz, 25 Hz,  30 Hz and patient tolerated well. Photic stimulation:  Mild bilateral symmetric driving response noted.     SLEEP:  Stage I.    EKG:  EKG showed normal sinus rhythm without any significant  abnormality. IMPRESSION:        No significant focal, lateralized or epileptiform features    noted during the current recording. Further clinical correlation and followup recommended. Latoya Jackson MD, [address] [place] [address]     Board Certified in Neurology  & in  [id number] of Psychiatry and Neurology ([facility]).               D: 02/11/2022 16:33:37       T: 02/11/2022 17:42:19     [id number]  Job#: [id number]     Doc#: [id number]    CC:  Jean Paul Pratt

## 2022-02-23 ENCOUNTER — HOSPITAL ENCOUNTER (OUTPATIENT)
Dept: LAB | Age: 22
Discharge: HOME | End: 2022-02-23
Payer: COMMERCIAL

## 2022-02-23 DIAGNOSIS — F90.8 ATTENTION DEFICIT HYPERACTIVITY DISORDER (ADHD), OTHER TYPE: ICD-10-CM

## 2022-02-23 DIAGNOSIS — R68.84 JAW PAIN: ICD-10-CM

## 2022-02-23 DIAGNOSIS — F32.0 CURRENT MILD EPISODE OF MAJOR DEPRESSIVE DISORDER, UNSPECIFIED WHETHER RECURRENT (HCC): ICD-10-CM

## 2022-02-23 DIAGNOSIS — R41.3 MEMORY PROBLEM: ICD-10-CM

## 2022-02-23 DIAGNOSIS — F41.9 ANXIETY: ICD-10-CM

## 2022-02-23 DIAGNOSIS — G43.019 INTRACTABLE MIGRAINE WITHOUT AURA AND WITHOUT STATUS MIGRAINOSUS: ICD-10-CM

## 2022-02-23 DIAGNOSIS — G47.9 SLEEP DIFFICULTIES: ICD-10-CM

## 2022-02-23 DIAGNOSIS — R41.3 MEMORY LOSS: ICD-10-CM

## 2022-02-23 DIAGNOSIS — M54.2 NECK PAIN: ICD-10-CM

## 2022-02-23 DIAGNOSIS — R42 DIZZINESS: ICD-10-CM

## 2022-02-23 DIAGNOSIS — G44.219 EPISODIC TENSION-TYPE HEADACHE, NOT INTRACTABLE: ICD-10-CM

## 2022-02-23 LAB
ANION GAP SERPL CALCULATED.3IONS-SCNC: 10 MMOL/L (ref 9–17)
CHLORIDE BLD-SCNC: 101 MMOL/L (ref 98–107)
CO2: 29 MMOL/L (ref 20–31)
FOLATE: 14.3 NG/ML
HCT VFR BLD CALC: 43 % (ref 40.7–50.3)
HEMOGLOBIN: 15 G/DL (ref 13–17)
MCH RBC QN AUTO: 29.5 PG (ref 25.2–33.5)
MCHC RBC AUTO-ENTMCNC: 34.9 G/DL (ref 25.2–33.5)
MCV RBC AUTO: 84.5 FL (ref 82.6–102.9)
NRBC AUTOMATED: 0 PER 100 WBC
PDW BLD-RTO: 12.3 % (ref 11.8–14.4)
PLATELET # BLD: 297 K/UL (ref 138–453)
PMV BLD AUTO: 10.2 FL (ref 8.1–13.5)
POTASSIUM SERPL-SCNC: 4 MMOL/L (ref 3.7–5.3)
RBC # BLD: 5.09 M/UL (ref 4.21–5.77)
RHEUMATOID FACTOR: <10 IU/ML
SEDIMENTATION RATE, ERYTHROCYTE: 4 MM (ref 0–15)
SODIUM BLD-SCNC: 140 MMOL/L (ref 135–144)
T. PALLIDUM, IGG: NONREACTIVE
TSH SERPL DL<=0.05 MIU/L-ACNC: 1.14 MIU/L (ref 0.3–5)
VITAMIN B-12: 750 PG/ML (ref 232–1245)
WBC # BLD: 8.4 K/UL (ref 4.5–13.5)

## 2022-02-23 PROCEDURE — 82746 ASSAY OF FOLIC ACID SERUM: CPT

## 2022-02-23 PROCEDURE — 80051 ELECTROLYTE PANEL: CPT

## 2022-02-23 PROCEDURE — 82607 VITAMIN B-12: CPT

## 2022-02-23 PROCEDURE — 85652 RBC SED RATE AUTOMATED: CPT

## 2022-02-23 PROCEDURE — 86038 ANTINUCLEAR ANTIBODIES: CPT

## 2022-02-23 PROCEDURE — 85613 RUSSELL VIPER VENOM DILUTED: CPT

## 2022-02-23 PROCEDURE — 36415 COLL VENOUS BLD VENIPUNCTURE: CPT

## 2022-02-23 PROCEDURE — 86694 HERPES SIMPLEX NES ANTBDY: CPT

## 2022-02-23 PROCEDURE — 86618 LYME DISEASE ANTIBODY: CPT

## 2022-02-23 PROCEDURE — 85027 COMPLETE CBC AUTOMATED: CPT

## 2022-02-23 PROCEDURE — 86696 HERPES SIMPLEX TYPE 2 TEST: CPT

## 2022-02-23 PROCEDURE — 85610 PROTHROMBIN TIME: CPT

## 2022-02-23 PROCEDURE — 86780 TREPONEMA PALLIDUM: CPT

## 2022-02-23 PROCEDURE — 86431 RHEUMATOID FACTOR QUANT: CPT

## 2022-02-23 PROCEDURE — 84443 ASSAY THYROID STIM HORMONE: CPT

## 2022-02-23 PROCEDURE — 86617 LYME DISEASE ANTIBODY: CPT

## 2022-02-23 PROCEDURE — 86225 DNA ANTIBODY NATIVE: CPT

## 2022-02-23 PROCEDURE — 86695 HERPES SIMPLEX TYPE 1 TEST: CPT

## 2022-02-23 PROCEDURE — 86147 CARDIOLIPIN ANTIBODY EA IG: CPT

## 2022-02-23 PROCEDURE — 85730 THROMBOPLASTIN TIME PARTIAL: CPT

## 2022-02-24 LAB
ANTI DNA DOUBLE STRANDED: 1.7 IU/ML
ANTI-NUCLEAR ANTIBODY (ANA): NEGATIVE
ENA ANTIBODIES SCREEN: 0.3 U/ML

## 2022-03-01 LAB
HERPES SIMPLEX VIRUS 1 IGG: 0.87
HERPES SIMPLEX VIRUS 2 IGG: 0.29
HERPES TYPE 1/2 IGM COMBINED: 0.7
LYME ANTIBODY: 1.55

## 2022-03-02 ENCOUNTER — OFFICE VISIT (OUTPATIENT)
Dept: NEUROLOGY | Age: 22
End: 2022-03-02
Payer: COMMERCIAL

## 2022-03-02 VITALS
OXYGEN SATURATION: 98 % | WEIGHT: 191.4 LBS | HEART RATE: 98 BPM | SYSTOLIC BLOOD PRESSURE: 130 MMHG | BODY MASS INDEX: 27.4 KG/M2 | HEIGHT: 70 IN | DIASTOLIC BLOOD PRESSURE: 82 MMHG

## 2022-03-02 DIAGNOSIS — G47.9 SLEEP DIFFICULTIES: ICD-10-CM

## 2022-03-02 DIAGNOSIS — F90.0 ATTENTION DEFICIT HYPERACTIVITY DISORDER (ADHD), PREDOMINANTLY INATTENTIVE TYPE: ICD-10-CM

## 2022-03-02 DIAGNOSIS — R42 DIZZINESS: ICD-10-CM

## 2022-03-02 DIAGNOSIS — R41.3 MEMORY PROBLEM: ICD-10-CM

## 2022-03-02 DIAGNOSIS — G44.219 EPISODIC TENSION-TYPE HEADACHE, NOT INTRACTABLE: ICD-10-CM

## 2022-03-02 DIAGNOSIS — G44.221 CHRONIC TENSION-TYPE HEADACHE, INTRACTABLE: ICD-10-CM

## 2022-03-02 DIAGNOSIS — G43.009 MIGRAINE WITHOUT AURA AND WITHOUT STATUS MIGRAINOSUS, NOT INTRACTABLE: Primary | ICD-10-CM

## 2022-03-02 DIAGNOSIS — Z78.9 ELECTRONIC CIGARETTE USE: ICD-10-CM

## 2022-03-02 DIAGNOSIS — M54.2 NECK PAIN: ICD-10-CM

## 2022-03-02 DIAGNOSIS — F41.9 ANXIETY: ICD-10-CM

## 2022-03-02 DIAGNOSIS — R41.3 MEMORY LOSS: ICD-10-CM

## 2022-03-02 DIAGNOSIS — F32.0 CURRENT MILD EPISODE OF MAJOR DEPRESSIVE DISORDER, UNSPECIFIED WHETHER RECURRENT (HCC): ICD-10-CM

## 2022-03-02 PROCEDURE — 99214 OFFICE O/P EST MOD 30 MIN: CPT | Performed by: PSYCHIATRY & NEUROLOGY

## 2022-03-02 PROCEDURE — G8419 CALC BMI OUT NRM PARAM NOF/U: HCPCS | Performed by: PSYCHIATRY & NEUROLOGY

## 2022-03-02 PROCEDURE — G8484 FLU IMMUNIZE NO ADMIN: HCPCS | Performed by: PSYCHIATRY & NEUROLOGY

## 2022-03-02 PROCEDURE — G8427 DOCREV CUR MEDS BY ELIG CLIN: HCPCS | Performed by: PSYCHIATRY & NEUROLOGY

## 2022-03-02 PROCEDURE — 1036F TOBACCO NON-USER: CPT | Performed by: PSYCHIATRY & NEUROLOGY

## 2022-03-02 PROCEDURE — 99213 OFFICE O/P EST LOW 20 MIN: CPT | Performed by: PSYCHIATRY & NEUROLOGY

## 2022-03-02 RX ORDER — RIZATRIPTAN BENZOATE 10 MG/1
TABLET, ORALLY DISINTEGRATING ORAL
Qty: 12 TABLET | Refills: 2 | Status: SHIPPED | OUTPATIENT
Start: 2022-03-02

## 2022-03-02 RX ORDER — RIZATRIPTAN BENZOATE 10 MG/1
TABLET, ORALLY DISINTEGRATING ORAL
COMMUNITY
Start: 2022-02-22 | End: 2022-03-02 | Stop reason: SDUPTHER

## 2022-03-02 ASSESSMENT — ENCOUNTER SYMPTOMS
EYE REDNESS: 0
BLOOD IN STOOL: 0
EYE DISCHARGE: 0
RHINORRHEA: 0
TROUBLE SWALLOWING: 0
BACK PAIN: 0
WHEEZING: 0
ABDOMINAL DISTENTION: 0
CONSTIPATION: 0
VOICE CHANGE: 0
SCALP TENDERNESS: 0
PHOTOPHOBIA: 1
CHEST TIGHTNESS: 0
SHORTNESS OF BREATH: 0
BLURRED VISION: 0
COLOR CHANGE: 0
FACIAL SWELLING: 0
EYE WATERING: 0
EYE ITCHING: 0
EYE PAIN: 0
FACIAL SWEATING: 0
SINUS PRESSURE: 0
DIARRHEA: 0
CHOKING: 0
APNEA: 0

## 2022-03-02 NOTE — PROGRESS NOTES
[facility]  Neurology  [address]:[phone]   Fax: [phone]    SUBJECTIVE:     PATIENT ID:  Donnie Lerner is a  RIGHT    HANDED 24 y.o. male. Migraine   This is a chronic problem. Episode onset: SINCE    AGE OF   6  YEARS. The problem occurs intermittently. The problem has been waxing and waning. The pain is located in the left unilateral region. The pain does not radiate. The pain quality is not similar to prior headaches. The quality of the pain is described as aching, stabbing, pulsating and throbbing. The pain is at a severity of 3/10. The pain is mild. Associated symptoms include dizziness, phonophobia and photophobia. Pertinent negatives include no abnormal behavior, back pain, blurred vision, drainage, ear pain, eye pain, eye redness, eye watering, facial sweating, hearing loss, insomnia, loss of balance, muscle aches, rhinorrhea, scalp tenderness, seizures, sinus pressure, tingling, tinnitus or weight loss. The symptoms are aggravated by bright light, emotional stress and noise. Treatments tried: TOPAMAX. The treatment provided significant relief. His past medical history is significant for migraine headaches and recent head traumas. There is no history of cancer, cluster headaches, hypertension, immunosuppression, migraines in the family, obesity, pseudotumor cerebri, sinus disease or TMJ. History obtained from  The patient     and other  available   medical  records   were  Also  reviewed. The  Duration,  Quality,  Severity,  Location,  Timing,  Context,  Modifying  Factors   Of   The   Chief   Complaint       And  Present  Illness  Was   Reviewed   In   Chronological   Manner.                                                 PATIENT'S  MAIN  CONCERNS INCLUDE :                       1)     H/O    CHRONIC    TENSION   HEADACHES                                             SINCE  AGE OF   6   YEARS 2)      H/O    CHRONIC   MIGRAINES  SINCE  AGE OF   6   YEARS                                     ASSOCIATED    WITH     DIZZINESS,    NAUSEA    AND  VOMITING                                              1- 2    TIMES     PER  WEEK     LASTING  FOR     4 - 5  HOURS                             2)       HAD   NEUROLOGY    EVALUATION      AT  [facility]     IN   2018                                              HAD   TRIED    TOPAMAX  IN    THE PAST     AND                                                     IT  DID  NOT  HELP                                                        3)      H/O       CHRONIC      ANXIETY  ,   DEPRESSION                                          AND   ADHD    SINCE     AGE    5 - 6  YEARS                                PATIENT  TO  FOLLOW  WITH  MENTAL  HEALTH PROFESSION                           4)       FAMILY     H/O     MIGRAINES                           5)     PATIENT  DENIES  ANY    BIRTH  AND  DEVELOPMENTAL PROBLEMS                                NO     H/O   HEAD  INJURY  IN   THE PAST                                           6)    H/O     CHRONIC  SLEEP   DIFFICULTIES                                  -  BETTER  ON  TRAZODONE                           7)     CHRONIC  MILD  MEMORY PROBLEMS                                       -    NEEDS   MONITORING                             7)         HAD   NEURO  DIAGNOSTIC  EVALUATIONS  IN   SEPT. 2020                             A)       MRI  BRAIN  AND  EEG    DID  NOT  SHOW                                           ANY  SIGNIFICANT  ABNORMALITIES                            B)    LABS    IN  OCT. 2020       ARE    WITH IN  NORMAL  LIMITS                                         8)      PATIENT      STARTED    ON       TOPAMAX,  IMITREX                                     TRAZODONE      IN    OCT.    2020     FOR                                 HIS  CHRONIC  HEADACHES  AND    MIGRAINES , SLEEP  DIFFICULTIES                                                 PATIENT  TOLERATING   THE   SAME      AND     GETTING   BENEFIT. 9)         UBRALEVY   TABLETS ,     AIMOVIG  INJECTIONS    WERE                                    NOT  APPROVED   BY  HIS INSURANCE                      10)       PROLONGED   MIGRAINE   WITH  NAUSEA    AND   VOMITING                                                     IN      SEPT. 2021                                 PATIENT    WAS    SEEN   ER       AND   TREATED    WITH                                 INJECTION    TORADOL                          11)       PATIENT'S    MIGRAINES  AND  HEADACHES      ARE   FAIRLY                                WELL  CONTROLLED     IN    OCT.  2021                          12)      H/O     SEVERE      MIGRAINES        REQUIRING      ER    VISITS                                        4    TIMES       REQUIRING   INJECTABLE    MEDICATIONS                                      BETWEEN    NOV. 2021    AND  JAN. 2022                          13)       H/O     INCREASED   MEMORY  LOSS ,   DIZZINESS                                                NECK  PAIN,    LEFT   JAW  PAIN                                               AND   SLEEP   DIFFICULTIES                                               SINCE       NOV. 2021                                     14)           EXPECTATIONS,   GOALS   OF    HEADACHE    &   MIGRAINE     MANAGEMENT                                   AND  SIDE  EFFECTS  MEDICATIONS    WERE                                        REVIEWED     AND   DISCUSSED    IN    DETAIL.                                                   15)       PATIENT    HAD     FOLLOW  UP  NEURO  DIAGNOSTIC  EVALUATION                                       IN     FEB. 2022                               CT   HEAD,   CT   CERVICAL  SPINE,     EEG       AND                                EXTENSIVE  LAB   WORK UP      SHOWED                                      NO  SIGNIFICANT  ABNORMALITIES                                            -   RESULTS   REVIEWED    WITH  PATIENT                      16)       RECOMMENDED    :                                 A)      ADEQUATE  SLEEP   AND  REST                                 B)        TO   CONTINUE      IMITREX/   MAXALT   MLT,                                                 TOPAMAX,    NAPROXEN                                                  TRAZODONE        AS    BEFORE                                                            C)    FIORICET      AS   NEEDED                                    D)      FOLLOW  UP    WITH  MENTAL  HEALTH  PROFESSIONALS                                          PRECIPITATING  FACTORS: including  fever/infection, exertion/relaxation, position change, stress, weather change,                        medications/alcohol, time of day/darkness/light  Are  present                                                       MODIFYING  FACTORS:  fever/infection, exertion/relaxation, position change, stress, weather change,                        medications/alcohol, time of day/darkness/light  Are  present             Patient   Indicates   The  Presence   And  The  Absence  Of  The  Following    Associated  And       Additional  Neurological    Symptoms:                                Balance  And coordination   problems  absent           Gait problems     absent            Headaches      present              Migraines           present           Memory problemsabsent              Confusion        absent            Paresthesia   numbness          absent           Seizures  And  Starring  Episodes           absent           Syncope,  Near  syncopal episodes         absent           Speech   problems           absent             Swallowing   Problems      absent            Dizziness,  Light headedness           present              Vertigo        absent Generalized   Weakness    absent              focal  Weakness     absent             Tremors         absent              Sleep  Problems     absent             History  Of   Recent  Head  Injury     absent             History  Of   Recent  TIA     absent             History  Of   Recent    Stroke     absent             Neck  Pain   and   Neck muscle  Spasms  absent               Radiating  down   And   Weakness           absent            Lower back   Pain  And     Spasms  absent              Radiating    Down   And   Weakness          absent                H/OFALLS        absent               History  Of   Visual  Symptoms    absent                  Associated   Diplopia       absent                                               Also   Additional   Symptoms   Present    As  Documented    In   The   detailed                  Review  Of  Systems   And    Please   Refer   To    Them for   Additional    Information. Any components  That are either  Unobtainable  Or  Limited  In   HPI, ROS  And/or PFSH   Are                   Due   ToPatient's  Medical  Problems,  Clinical  Condition   and/or lack of                                other    Alternate   resources.           RECORDS   REVIEWED:    historical medical records       INFORMATION   REVIEWED:     MEDICAL   HISTORY,SURGICAL   HISTORY,     MEDICATIONS   LIST,   ALLERGIES AND  DRUG  INTOLERANCES,       FAMILY   HISTORY,  SOCIAL  HISTORY,      PROBLEM  LIST   FOR  PATIENT  CARE   COORDINATION      Past Medical History:   Diagnosis Date    ADHD (attention deficit hyperactivity disorder)     Anxiety     Depression     Epigastric pain     Headache     Weight gain          Past Surgical History:   Procedure Laterality Date    COLONOSCOPY  03/27/2019    [facility]    HARDWARE REMOVAL  01/17/2019    LEG SURGERY Right 10/2017    UPPER GASTROINTESTINAL ENDOSCOPY      [facility]         Current Outpatient Medications   Medication Sig Dispense Refill    rizatriptan (MAXALT-MLT) 10 MG disintegrating tablet One     Tablet  Twice  Daily   As  Needed    For   migraine 12 tablet 2    topiramate (TOPAMAX) 100 MG tablet ONE  TABLET   TWICE  DAILY 60 tablet 2    SUMAtriptan (IMITREX) 100 MG tablet Take 1 tablet by mouth once as needed for Migraine 12 tablet 2    naproxen (EC-NAPROSYN) 500 MG EC tablet Take 1 tablet by mouth 2 times daily (with meals) AS  NEEDED 60 tablet 1    omeprazole (PRILOSEC) 20 MG delayed release capsule Take 1 capsule by mouth daily 30 capsule 2    ondansetron (ZOFRAN ODT) 4 MG disintegrating tablet Take 1 tablet by mouth every 8 hours as needed for Nausea or Vomiting 30 tablet 1    traZODone (DESYREL) 100 MG tablet ONE  TABLET   AT  BEDTIME 30 tablet 2    Erenumab-aooe 140 MG/ML SOAJ Inject 140 mg into the skin every 30 days 1 pen 2    butalbital-acetaminophen-caffeine (ESGIC) -40 MG per tablet ONE  TO  TWO  TABLETS  IN  THE  EVENING  AND  BED TIME  AS  NEEDED 60 tablet 1    albuterol sulfate HFA (PROVENTIL HFA) 108 (90 Base) MCG/ACT inhaler Inhale 2 puffs into the lungs      VENTOLIN  (90 Base) MCG/ACT inhaler inhale 2 puffs by mouth every 4 hours if needed  0     No current facility-administered medications for this visit.          Allergies   Allergen Reactions    Dilaudid [Hydromorphone Hcl]     Methylprednisolone Other (See Comments)     Headaches (made migraines worse)    Prozac [Fluoxetine Hcl]     Trazodone Other (See Comments)     Sometimes woke up with headache    Zoloft [Sertraline Hcl]     Amoxicillin Rash    Meperidine Nausea And Vomiting         Family History   Problem Relation Age of Onset    High Blood Pressure Mother     Asthma Mother     COPD Mother     Arthritis Mother         Rhuematoid    Heart Disease Mother     Bipolar Disorder Mother     Heart Attack Maternal Grandmother     Early Death Maternal Grandmother 61    Heart Attack Maternal Grandfather     Early Death Maternal Grandfather 61    Heart Disease Paternal Grandmother     Diabetes Paternal Grandmother     Colon Cancer Maternal Aunt 54    Crohn's Disease Neg Hx     Ulcerative Colitis Neg Hx     Colon Polyps Neg Hx          Social History     Socioeconomic History    Marital status: Single     Spouse name: Not on file    Number of children: Not on file    Years of education: Not on file    Highest education level: Not on file   Occupational History    Not on file   Tobacco Use    Smoking status: Passive Smoke Exposure - Never Smoker    Smokeless tobacco: Never Used    Tobacco comment: Former user- for about 6 months   Vaping Use    Vaping Use: Former    Quit date: 1/1/2019    Substances: Always   Substance and Sexual Activity    Alcohol use: No    Drug use: No    Sexual activity: Not on file   Other Topics Concern    Not on file   Social History Narrative    Not on file     Social Determinants of Health     Financial Resource Strain:     Difficulty of Paying Living Expenses: Not on file   Food Insecurity:     Worried About [address] in the Last Year: Not on file    [address] in the Last Year: Not on file   Transportation Needs:     Lack of Transportation (Medical): Not on file    Lack of Transportation (Non-Medical):  Not on file   Physical Activity:     Days of Exercise per Week: Not on file    Minutes of Exercise per Session: Not on file   Stress:     Feeling of Stress : Not on file   Social Connections:     Frequency of Communication with Friends and Family: Not on file    Frequency of Social Gatherings with Friends and Family: Not on file    Attends Christian Services: Not on file    Active Member of Clubs or Organizations: Not on file    Attends Club or Organization Meetings: Not on file    Marital Status: Not on file   Intimate Partner Violence:     Fear of Current or Ex-Partner: Not on file    Emotionally Abused: Not on file    Physically Abused: Not on file  Sexually Abused: Not on file   Housing Stability:     Unable to Pay for Housing in the Last Year: Not on file    Number of Places Lived in the Last Year: Not on file    Unstable Housing in the Last Year: Not on file       Vitals:    03/02/22 1504   BP: 130/82   Pulse: 98   SpO2: 98%         Wt Readings from Last 3 Encounters:   03/02/22 191 lb 6.4 oz (86.8 kg)   01/27/22 188 lb (85.3 kg)   10/21/21 176 lb (79.8 kg)         BP Readings from Last 3 Encounters:   03/02/22 130/82   01/27/22 124/76   10/21/21 122/84             Review of Systems   Constitutional: Negative for appetite change, unexpected weight change and weight loss. HENT: Negative for dental problem, drooling, ear discharge, ear pain, facial swelling, hearing loss, mouth sores, nosebleeds, postnasal drip, rhinorrhea, sinus pressure, tinnitus, trouble swallowing and voice change. Eyes: Positive for photophobia. Negative for blurred vision, pain, discharge, redness, itching and visual disturbance. Respiratory: Negative for apnea, choking, chest tightness, shortness of breath and wheezing. Cardiovascular: Negative for palpitations and leg swelling. Gastrointestinal: Negative for abdominal distention, blood in stool, constipation and diarrhea. Endocrine: Negative for cold intolerance, heat intolerance, polydipsia, polyphagia and polyuria. Musculoskeletal: Negative for back pain, gait problem and neck stiffness. Skin: Negative for color change, pallor and wound. Allergic/Immunologic: Negative for environmental allergies, food allergies and immunocompromised state. Neurological: Positive for dizziness. Negative for tingling, tremors, seizures, syncope, facial asymmetry, speech difficulty, light-headedness and loss of balance. Hematological: Negative for adenopathy. Does not bruise/bleed easily. Psychiatric/Behavioral: Positive for decreased concentration.  Negative for agitation, behavioral problems, confusion, dysphoric mood, hallucinations, self-injury, sleep disturbance and suicidal ideas. The patient is nervous/anxious. The patient does not have insomnia and is not hyperactive. OBJECTIVE:    Physical Exam  Constitutional:       Appearance: He is well-developed. HENT:      Head: Normocephalic and atraumatic. No raccoon eyes or Gordon's sign. Right Ear: External ear normal.      Left Ear: External ear normal.      Nose: Nose normal.   Eyes:      Conjunctiva/sclera: Conjunctivae normal.      Pupils: Pupils are equal, round, and reactive to light. Neck:      Thyroid: No thyroid mass or thyromegaly. Vascular: No carotid bruit. Trachea: No tracheal deviation. Meningeal: Brudzinski's sign and Kernig's sign absent. Cardiovascular:      Rate and Rhythm: Normal rate and regular rhythm. Pulmonary:      Effort: Pulmonary effort is normal.   Musculoskeletal:         General: No tenderness. Normal range of motion. Cervical back: Normal range of motion and neck supple. No rigidity. No muscular tenderness. Normal range of motion. Skin:     General: Skin is warm. Coloration: Skin is not pale. Findings: No erythema or rash. Nails: There is no clubbing. Psychiatric:         Attention and Perception: He is attentive. Mood and Affect: Mood is anxious. Mood is not depressed. Affect is not labile, blunt or inappropriate. Speech: He is communicative. Speech is not rapid and pressured, delayed, slurred or tangential.         Behavior: Behavior is not agitated, slowed, aggressive, withdrawn, hyperactive or combative. Behavior is cooperative. Thought Content: Thought content is not paranoid or delusional. Thought content does not include homicidal or suicidal ideation. Thought content does not include homicidal or suicidal plan. Cognition and Memory: Memory is not impaired. He does not exhibit impaired recent memory or impaired remote memory.          Judgment: Judgment is not impulsive or inappropriate. NEUROLOGICALEXAMINATION :       A) MENTAL STATUS:                   Alert and  oriented  To time, place  And  Person. No Aphasia. No  Dysarthria. Able   To  Follow   THREE    Stepcommands   without   Any  Difficulty. No right  To left confusion. Normal  Speech  And language function. Insight and  Judgment ,Fund  Of  Knowledge   within normal limits                Recent  And  Remote memory  within   normal limits                Attention &  Concentration are within   normal limits                                                 B) CRANIAL NERVES :      CN : Visual  Acuity  And  Visual fields  within normal limits               Fundi  Could  Not  Be  Could  Not  Be  Evaluated. 3,4,6 CN : Both  Pupils are   Reactive and  Equal.  Movements  Are  Intact. No  Nystagmus. No  MANUEL. No  Afferent  Pupillary  Defect noted. 5 CN :  Normal  Facial sensations and Corneal  Reflexes           7 CN:  Normal  Facial  Symmetry  And  Strength. No facial  Weakness. 8 CN :  Hearing  Appears within normal limits          9, 10 CN: Normal   spontaneous, reflex   palate   movements         11 CN:   Normal  Shoulder  shrug and  strength         12 CN :   Normal  Tongue movements and  Tongue  In midline                        No tongue   Fasciculations or atrophy       C) MOTOR  EXAM:                 Strength  In upper  And  Lower   extremities   within   normal limits               No  Drift. No  Atrophy               Rapid   alternating  And  repetitions  Movements  within   normal limits               Muscle  Tone  In upper  And  Lower  Extremities  normal                No rigidity. No  Spasticity. Bradykinesia   absent               No  Asterixis.               Sustention  Tremor , Resting   Tremor   absent                    No other  Abnormal  Movements noted           D) SENSORY :               Light   touch, pinprick,   position  And  Vibration  within normal limits        E) REFLEXES:                   Deep  Tendon  Reflexes   normal                  No  pathological  Reflexes  Bilaterally.                                   F) COORDINATION  AND  GAIT :                                Station and  Gait  normal                              Romberg 's test negative                          Ataxia negative        ASSESSMENT:        Patient Active Problem List   Diagnosis    Electronic cigarette use    ADHD (attention deficit hyperactivity disorder)    Anxiety    Depression    Headache    Migraine without aura and without status migrainosus, not intractable    Dizziness    Sleep difficulties    Chronic tension-type headache, intractable    Memory problem    Episodic tension-type headache, not intractable    Neck pain    Memory loss    Jaw pain, non-TMJ    Status migrainosus         CT OF THE HEAD WITHOUT CONTRAST  2/11/2022 2:30 pm       TECHNIQUE:   CT of the head was performed without the administration of intravenous   contrast. Dose modulation, iterative reconstruction, and/or weight based   adjustment of the mA/kV was utilized to reduce the radiation dose to as low   as reasonably achievable.       COMPARISON:   MRI brain 09/11/2020       HISTORY:   ORDERING SYSTEM PROVIDED HISTORY: Episodic tension-type headache, not   intractable   Reason for Exam: Headaches, memory issues       FINDINGS:   BRAIN/VENTRICLES: No acute intracranial hemorrhage, mass effect or midline   shift.  No abnormal extra-axial fluid collection.  The gray-white   differentiation is maintained without evidence of an acute infarct.  No   evidence of hydrocephalus.       ORBITS: The visualized portion of the orbits demonstrate no acute abnormality.       SINUSES: Mucosal thickening anterior right ethmoid sinus.  Mild area of   polypoid mucosal thickening posteromedial left sphenoid sinus.  Mastoid air   cells are well aerated.       SOFT TISSUES/SKULL:  No acute abnormality of the visualized skull or soft   tissues.           Impression   Negative noncontrast CT examination of the brain.           CT OF THE CERVICAL SPINE WITHOUT CONTRAST 2/11/2022 2:29 pm       TECHNIQUE:   CT of the cervical spine was performed without the administration of   intravenous contrast. Multiplanar reformatted images are provided for review. Dose modulation, iterative reconstruction, and/or weight based adjustment of   the mA/kV was utilized to reduce the radiation dose to as low as reasonably   achievable.       COMPARISON:   None.       HISTORY:   ORDERING SYSTEM PROVIDED HISTORY: Episodic tension-type headache, not   intractable   Reason for Exam: Headaches, memory issues       FINDINGS:   BONES/ALIGNMENT: No acute fracture or traumatic malalignment.  Variant   unfused posterior arch C1.       DEGENERATIVE CHANGES: No significant degenerative changes.       SOFT TISSUES: No prevertebral soft tissue swelling.           Impression   Unremarkable CT examination of the cervical spine.             MRI OF THE BRAIN WITHOUT AND WITH CONTRAST  9/11/2020 1:15 pm         TECHNIQUE:    Multiplanar multisequence MRI of the head/brain was performed without and    with the administration of intravenous contrast.         COMPARISON:    None.         HISTORY:    ORDERING SYSTEM PROVIDED HISTORY: Chronic nonintractable headache,    unspecified headache type    TECHNOLOGIST PROVIDED HISTORY:    Reason for Exam:  Chronic migraine headaches, worse recently.     Acuity: Chronic    Type of Exam: Unknown    Additional signs and symptoms: Chronic non intractable headache, unspecified    headache type; Migraine without aura and without status migrainosus, not    intractable; Dizziness.         FINDINGS:    Motion degrades images limiting evaluation.         INTRACRANIAL STRUCTURES/VENTRICLES: Lei Lee is no acute infarct. No mass    effect or midline shift. No evidence of an acute intracranial hemorrhage. The ventricles and sulci are normal in size and configuration.  The    sellar/suprasellar regions appear unremarkable.  The normal signal voids    within the major intracranial vessels appear maintained.  No abnormal focus    of enhancement is seen within the brain.         ORBITS: The visualized portion of the orbits demonstrate no acute abnormality.         SINUSES: Mild scattered mucosal thickening of the paranasal sinuses.  The    mastoid air cells demonstrate no acute abnormality.         BONES/SOFT TISSUES: The bone marrow signal intensity appears normal. The soft    tissues demonstrate no acute abnormality.              Impression    1. Patient motion partially limits evaluation. 2. Grossly unremarkable MRI of the brain. 3. Mild scattered mucosal thickening of the paranasal sinuses. VISITING DIAGNOSIS:            ICD-10-CM    1. Migraine without aura and without status migrainosus, not intractable  G43.009    2. Episodic tension-type headache, not intractable  G44.219    3. Sleep difficulties  G47.9    4. Neck pain  M54.2    5. Memory problem  R41.3    6. Dizziness  R42    7. Current mild episode of major depressive disorder, unspecified whether recurrent ([facility] [address]  F32.0    8. Memory loss  R41.3    9. Chronic tension-type headache, intractable  G44.221    10. Attention deficit hyperactivity disorder (ADHD), predominantly inattentive type  F90.0    11. Anxiety  F41.9    12. Electronic cigarette use  Z78.9                     CONCERNS   &   INCREASED   RISK   FOR          *   POORLY  CONTROLLED   &  COMPLICATED  MIGRAINES         *    CHRONIC  TENSION  HEADACHES         *     MEMORY  LOSS               *    SEIZURE   ACTIVITY             *     DIZZINESS                   VARIOUS  RISK   FACTORS   WERE  REVIEWED   AND   DISCUSSED.             PLAN:                         Fabi Tolliver  Of The  Diagnoses,  The  Management & Treatment  Options            AND    Care  plan  Were          Reviewed and   Discussed   With  patient. * Goals  And  Expectations  Of  The  Therapy  Discussed   And  Reviewed. *   Benefits   And   Side  Effect  Profile  Of  Medication/s   Were   Discussed                * Need   For  Further   Follow up For  The  Various  Problems Were  discussed. * Results  Of  The  Previous  Diagnostic tests were reviewed and  discussed                 Medical  Decision  Making  Was  Made  Based on the   Complexity  Of  Above  Mentioned  Diagnoses,    Data reviewed             And    Risk  Of  Significant   Co morbidities and   complicating   Factors. Medical  Decision  Was   High  Complexity   Due   To  The  Patient's  Multiple  Symptoms,       Complex  Treatment  Regimen,  Multiple medications           and   Risk  Of   Side  Effects,  Difficulty  In  Medication  Management  And  Diagnostic  Challenges       In  View  Of  The  Associated   Co  Morbid  Conditions   And  Problems. *   BE  CAREFUL  WITH  ACTIVITIES   INCLUDING  DRIVING. *   ADEQUATE   FLUIDINTAKE   AND  ELECTROLYTE  BALANCE             * KEEP  DAIRY  OF   THE  NEUROLOGICAL  SYMPTOMS        RECORDING THE    DURATION  AND  FREQUENCY. *  AVOID    CONDITIONS  AND  FACTORS   THAT  MAKE                  NEUROLOGICAL  SYMPTOMS  WORSE.                  *TO  MAINTAIN  REGULAR  SLEEP  WAKE  CYCLES. *   TO  HAVE  ADEQUATE  REST  AND   SLEEP    HOURS.            *    AVOID  ANY USAGE OF    TOBACCO,              EXCESSIVE  ALCOHOL  AND   ILLEGAL   SUBSTANCES          *  CONTINUE   MEDICATIONS    PRESCRIBED     AS    RECOMMENDED       *   Compliance   With  Medications   And  Instructions          *    MIGRAINE/ HEADACHE    DAIRY   WITH  MONITORING                       OF  DURATION  AND  FREQUENCY.             *    Prophylactic  Use   Of     Vitamin   B Complex,  Folic  Acid,    Vitamin  B12    Multivitamin,       Calcium  With  magnesium  And  Vit D    Supplementations   Over  The  Counter  Discussed                                      Orders Placed This Encounter   Medications    rizatriptan (MAXALT-MLT) 10 MG disintegrating tablet     Sig: One     Tablet  Twice  Daily   As  Needed    For   migraine     Dispense:  12 tablet     Refill:  2                       *PATIENT   TO  FOLLOW  UP  WITH   PRIMARY  CARE         OTHER  CONSULTANTS  AS  BEFORE.           *TO  FOLLOW  WITH   MENTAL  HEALTH  PROFESSIONALS ,  INCLUDING            PSYCHOLOGICAL  COUNSELING   AND  PSYCHIATRIC  EVALUTIONS,                   *  Maintain   Healthy  Life Style    With   Periodic  Monitoring  Of      Any  Medical  Conditions  Including   Elevated  Blood  Pressure,  Lipid  Profile,     Blood  Sugar levels  AndHeart  Disease. *   Period   Screening  For  Cancers  Involving  Breast,  Colon,    lungs  And  Other  Organs  As  Applicable,  In consultation   With  Your  Primary Care Providers. *Second  Neurological  Opinion  And  Evaluations  In  [facility]  Setting  If  Patient  Is  Interested. * Please   Contact   Neurology  Clinic   Early   If   Are  Any  New  Neurological   And  Any neurological  Concerns. *  If  The  Patient remains  Neurologically  Stable   Return   To  [facility] Neurology Department   IN     2 - 3      MONTHS  TIME   FOR  FURTHER              FOLLOW UP.                       *   The  Neurological   Findings,  Possible  Diagnosis,  Differential diagnoses                    And  Options  For    Further   Investigations                   And  management   Are  Discussed  Comprehensively. Medications   And  Prescription   Risks  And  Side effects  Are   Also  Discussed.                      *  If   There is  Any  Significant  Worsening   Of  Current  Symptoms  And Or                  If patient  Develops   Any additional  New  NeurologicalSymptoms                  Or  Significant  Concerns   Should  Call  911 or  Go  To  Emergency  Department                  For  Further  Immediate  Evaluation. The   Above  Were  Reviewed  With  patient   and                       questions  Answered  In  Detail. More   Than  50% of face  To face Time   Was  Spent  On  Counseling                    And   Coordination  Of  Care   Of   Patient's  multiple   Neurological  Problems                         And   Comorbid  Medical   Conditions. Electronically signed by Linda Stokes MD.,  [facility]      Board Certified in  Neurology &  In  [employer] ([employer])      DISCLAIMER:   Although every effort was made to ensure the accuracy of this  electronictranscription, some errors in transcription may have occurred. GENERAL PATIENT INSTRUCTIONS:      A Healthy Lifestyle: Care Instructions   Your Care Instructions   A healthy lifestyle can help you feel good, stay at ahealthy weight, and have plenty of energy for both work and play. A healthy lifestyle is something you can share with your whole family.  A healthy lifestyle also can lower your risk for serious health problems, such ashigh blood pressure, heart disease, and diabetes.  You can follow a few steps listed below to improve your health and the health of your family.  Follow-up careis a key part of your treatment and safety. Be sure to make and go to all appointments, and call your doctor if you are having problems. Its also a good idea to know your test results and keep a list of the medicines you take.  How can you care for yourself at home?  Do not eat too much sugar, fat, or fast foods. You can still have dessert and treats nowand then. The goal is moderation.  Start small to improve your eating habits.  Pay attention to portion sizes, drink less juice and soda pop, and eat more fruits and vegetables.  Eat a healthy amount of food. A 3-ounce serving of meat, for example, is about the size of a deck of cards. Fill the rest of your plate with vegetables and whole grains.  Limit theamount of soda and sports drinks you have every day. Drink more water when you are thirsty.  Eat at least 5 servings of fruits and vegetables every day. It may seem like a lot, but it is not hard to reach this goal. Aserving or helping is 1 piece of fruit, 1 cup of vegetables, or 2 cups of leafy, raw vegetables. Have an apple or some carrot sticks as an afternoon snack instead of a candy bar. Try to have fruits and/or vegetables at everymeal.   Make exercise part of your daily routine. You may want to start with simple activities, such as walking, bicycling, or slow swimming. Try carlos active 30 to 60 minutes every day. You do not need to do all 30 to 60 minutes all at once. For example, you can exercise 3 times a day for 10 or 20 minutes. Moderate exercise is safe for most people, but it is always agood idea to talk to your doctor before starting an exercise program.   Keep moving. Lui Handy the lawn, work in the garden, or [employer]. Take the stairs instead of the elevator at work.  If you smoke, quit. Peoplewho smoke have an increased risk for heart attack, stroke, cancer, and other lung illnesses. Quitting is hard, but there are ways to boost your chance of quitting tobacco for good.  Use nicotine gum, patches, or lozenges.  Ask your doctor about stop-smoking programs and medicines.  Keep trying.  In addition to reducing your risk of diseases in the future, you will notice some benefits soon after you stop using tobacco. If you have shortness of breath or asthma symptoms, they will likely getbetter within a few weeks after you quit.  Limit how much alcohol you drink.  Moderate amounts of alcohol (up to 2 drinks a day for men, 1drink a day for women) are okay. But drinking too much can lead to liver problems, high blood pressure, and other health problems.  health   If you have a family, there are many things you can do together to improve your health.  Eat meals together as a family as often as possible.  Eat healthy foods. This includes fruits, vegetables, lean meats and dairy, and whole grains.  Include your family in your fitness plan. Most peoplethink of activities such as jogging or tennis as the way to fitness, but there are many ways you and your family can be more active. Anything that makes you breathe hard and gets your heart pumping is exercise. Here are sometips:   Walk to do errands or to take your child to school or the bus.  Go for a family bike ride after dinner instead of watching TV.  Where can you learn more?  Go toVeducatps://[employer]jefferson.healthGet Me Listed. org and sign in to your [employer] account. Enter W168 in the Search HealthInformation box to learn more about \"A Healthy Lifestyle: Care Instructions. \"     If you do not have anaccount, please click on the \"Sign Up Now\" link.  Current as of: July 26, 2016   Content Version: 11.2   © 4664-6561 [employer]. Care instructions adapted under license by [facility] ([facility]). If you have questions about a medical condition or this instruction, always ask your healthcare professional. [employer] disclaims any warranty or liability for your use of this information.

## 2022-03-03 LAB
ANTICARDIOLIPIN IGA ANTIBODY: 6 APL (ref 0–14)
ANTICARDIOLIPIN IGG ANTIBODY: 2.1 GPL (ref 0–10)
CARDIOLIPIN AB IGM: 2 MPL (ref 0–10)
DILUTE RUSSELL VIPER VENOM TIME: NORMAL
INR BLD: 1
LYME IGM WB: NEGATIVE
LYME WESTERN BLOT IGG: NEGATIVE
PARTIAL THROMBOPLASTIN TIME: 24.4 SEC (ref 20.5–30.5)
PROTHROMBIN TIME: 10.4 SEC (ref 9.1–12.3)

## 2022-06-08 ENCOUNTER — TELEPHONE (OUTPATIENT)
Dept: NEUROLOGY | Age: 22
End: 2022-06-08

## 2022-06-08 NOTE — TELEPHONE ENCOUNTER
Pt contacted in re: missed appt with Dr. Moustapha Gordon - pt informed writer that \"I am no longer seeing Dr. Moustapha Gordon. \"

## 2024-10-25 NOTE — TELEPHONE ENCOUNTER
If these are same symptoms he has been having, he needs a CT of the abdomen and pelvis, including a CT enterography. [FreeTextEntry1] : Labs ordered, drawn in the office, reviewed, analyzed and discussed